# Patient Record
Sex: MALE | Race: OTHER | HISPANIC OR LATINO | URBAN - METROPOLITAN AREA
[De-identification: names, ages, dates, MRNs, and addresses within clinical notes are randomized per-mention and may not be internally consistent; named-entity substitution may affect disease eponyms.]

---

## 2018-02-16 ENCOUNTER — EMERGENCY (EMERGENCY)
Facility: HOSPITAL | Age: 28
LOS: 1 days | Discharge: HOME | End: 2018-02-16
Attending: EMERGENCY MEDICINE | Admitting: EMERGENCY MEDICINE
Payer: COMMERCIAL

## 2018-02-16 VITALS
OXYGEN SATURATION: 100 % | SYSTOLIC BLOOD PRESSURE: 133 MMHG | DIASTOLIC BLOOD PRESSURE: 65 MMHG | RESPIRATION RATE: 16 BRPM | TEMPERATURE: 98 F | HEART RATE: 65 BPM

## 2018-02-16 PROCEDURE — 99283 EMERGENCY DEPT VISIT LOW MDM: CPT

## 2018-02-16 NOTE — ED PROVIDER NOTE - OBJECTIVE STATEMENT
28yo m w/o pmh p/w urinary frequency and urgency for past two weeks. + hx of UTIs in the past, never followed with a urologist. No known fevers or chills. No new sexual contacts, no penile discharge. No n/v/d. + recent intentional weight loss after becoming vegan. + FH of DM.

## 2018-02-16 NOTE — ED PROVIDER NOTE - PLAN OF CARE
Follow up with your Doctor in 1-2 days.  Follow up with Urology see attached list.  Drink plenty of fluids.  Take Keflex 500mg orally 2 x a day x 7 days.  Return to the ER for any persistent/worsening or new symptoms fevers, chills, abdominal pain, difficulty urinating or any concerning symptoms.

## 2018-02-16 NOTE — ED ADULT TRIAGE NOTE - CHIEF COMPLAINT QUOTE
pt. came in c/o intermittent urinary frequency x 3 weeks. denies any pain upon urination, no hematuria nor penile discharge. denies any fever. no PMHx.

## 2018-02-16 NOTE — ED PROVIDER NOTE - ATTENDING CONTRIBUTION TO CARE
agree with resident note  26yo m w/o pmh p/w urinary frequency and urgency for past two weeks.  Has had remote hx of UTIs.  Denies penile discharge or new sexual partners.  Does not want to be tested for STis    PE: well appearing; VSS; CTAB/L; s1 s2 no m/r/g abd soft/NT/ND back: no CVA tenderness

## 2018-02-16 NOTE — ED PROVIDER NOTE - PROGRESS NOTE DETAILS
JONES Guillory: received sign out from Dr Powell to follow up UA and discharge home.  Rx sent for Keflex by Dr. Ramirez pt feels better ambulating without difficulty.  Results reviewed with patient.  Discharge reviewed and discussed with patient.

## 2018-02-16 NOTE — ED PROVIDER NOTE - CARE PLAN
Principal Discharge DX:	Dysuria  Assessment and plan of treatment:	Follow up with your Doctor in 1-2 days.  Follow up with Urology see attached list.  Drink plenty of fluids.  Take Keflex 500mg orally 2 x a day x 7 days.  Return to the ER for any persistent/worsening or new symptoms fevers, chills, abdominal pain, difficulty urinating or any concerning symptoms.

## 2018-02-17 LAB
APPEARANCE UR: CLEAR — SIGNIFICANT CHANGE UP
BILIRUB UR-MCNC: NEGATIVE — SIGNIFICANT CHANGE UP
BLOOD UR QL VISUAL: NEGATIVE — SIGNIFICANT CHANGE UP
COLOR SPEC: SIGNIFICANT CHANGE UP
GLUCOSE UR-MCNC: NEGATIVE — SIGNIFICANT CHANGE UP
KETONES UR-MCNC: NEGATIVE — SIGNIFICANT CHANGE UP
LEUKOCYTE ESTERASE UR-ACNC: NEGATIVE — SIGNIFICANT CHANGE UP
MUCOUS THREADS # UR AUTO: SIGNIFICANT CHANGE UP
NITRITE UR-MCNC: NEGATIVE — SIGNIFICANT CHANGE UP
PH UR: 6 — SIGNIFICANT CHANGE UP (ref 4.6–8)
PROT UR-MCNC: NEGATIVE MG/DL — SIGNIFICANT CHANGE UP
RBC CASTS # UR COMP ASSIST: SIGNIFICANT CHANGE UP (ref 0–?)
SP GR SPEC: 1.02 — SIGNIFICANT CHANGE UP (ref 1–1.04)
SQUAMOUS # UR AUTO: SIGNIFICANT CHANGE UP
UROBILINOGEN FLD QL: NORMAL MG/DL — SIGNIFICANT CHANGE UP
WBC UR QL: SIGNIFICANT CHANGE UP (ref 0–?)

## 2018-02-17 RX ORDER — CEPHALEXIN 500 MG
1 CAPSULE ORAL
Qty: 14 | Refills: 0
Start: 2018-02-17 | End: 2018-02-23

## 2018-02-17 RX ORDER — CEPHALEXIN 500 MG
500 CAPSULE ORAL EVERY 12 HOURS
Qty: 0 | Refills: 0 | Status: DISCONTINUED | OUTPATIENT
Start: 2018-02-17 | End: 2018-02-20

## 2018-02-17 RX ORDER — CEPHALEXIN 500 MG
1 CAPSULE ORAL
Qty: 14 | Refills: 0 | OUTPATIENT
Start: 2018-02-17 | End: 2018-02-23

## 2018-02-17 RX ADMIN — Medication 500 MILLIGRAM(S): at 01:09

## 2018-06-08 ENCOUNTER — EMERGENCY (EMERGENCY)
Facility: HOSPITAL | Age: 28
LOS: 1 days | Discharge: HOME | End: 2018-06-08
Attending: EMERGENCY MEDICINE | Admitting: EMERGENCY MEDICINE
Payer: COMMERCIAL

## 2018-06-08 VITALS
OXYGEN SATURATION: 98 % | RESPIRATION RATE: 18 BRPM | DIASTOLIC BLOOD PRESSURE: 86 MMHG | HEART RATE: 71 BPM | TEMPERATURE: 98 F | SYSTOLIC BLOOD PRESSURE: 155 MMHG

## 2018-06-08 LAB
APPEARANCE UR: CLEAR — SIGNIFICANT CHANGE UP
BACTERIA # UR AUTO: SIGNIFICANT CHANGE UP
BILIRUB UR-MCNC: NEGATIVE — SIGNIFICANT CHANGE UP
BLOOD UR QL VISUAL: NEGATIVE — SIGNIFICANT CHANGE UP
C TRACH RRNA SPEC QL NAA+PROBE: SIGNIFICANT CHANGE UP
COLOR SPEC: YELLOW — SIGNIFICANT CHANGE UP
GLUCOSE UR-MCNC: NEGATIVE — SIGNIFICANT CHANGE UP
KETONES UR-MCNC: SIGNIFICANT CHANGE UP
LEUKOCYTE ESTERASE UR-ACNC: HIGH
MUCOUS THREADS # UR AUTO: SIGNIFICANT CHANGE UP
N GONORRHOEA RRNA SPEC QL NAA+PROBE: SIGNIFICANT CHANGE UP
NITRITE UR-MCNC: NEGATIVE — SIGNIFICANT CHANGE UP
PH UR: 7 — SIGNIFICANT CHANGE UP (ref 4.6–8)
PROT UR-MCNC: 30 MG/DL — HIGH
RBC CASTS # UR COMP ASSIST: SIGNIFICANT CHANGE UP (ref 0–?)
SP GR SPEC: 1.03 — SIGNIFICANT CHANGE UP (ref 1–1.04)
SPECIMEN SOURCE: SIGNIFICANT CHANGE UP
UROBILINOGEN FLD QL: 1 MG/DL — SIGNIFICANT CHANGE UP
WBC UR QL: SIGNIFICANT CHANGE UP (ref 0–?)

## 2018-06-08 PROCEDURE — 99283 EMERGENCY DEPT VISIT LOW MDM: CPT | Mod: 25

## 2018-06-08 RX ORDER — CEPHALEXIN 500 MG
500 CAPSULE ORAL ONCE
Qty: 0 | Refills: 0 | Status: COMPLETED | OUTPATIENT
Start: 2018-06-08 | End: 2018-06-08

## 2018-06-08 RX ORDER — CEPHALEXIN 500 MG
1 CAPSULE ORAL
Qty: 14 | Refills: 0
Start: 2018-06-08 | End: 2018-06-14

## 2018-06-08 RX ADMIN — Medication 500 MILLIGRAM(S): at 04:53

## 2018-06-08 NOTE — ED ADULT NURSE NOTE - OBJECTIVE STATEMENT
Pt arrives to -B c/o urinary pressure and frequency for 2 days..  Pt denies pain.  Pt states he was seen at [facility] a few months ago for same reason and received antibiotics with relief of symptoms.  Pt tates for the past few months he has had it on and off.  Never followed up with PMD or urologist.  Pt is sexually active with one female partner - no new partners and no known h/o STDs.  Pt denies discharge.  Non sterile sample of urine collected and sent.  Awaiting clean sample.

## 2018-06-08 NOTE — ED ADULT TRIAGE NOTE - CHIEF COMPLAINT QUOTE
c/o Urgency and frequency of urination. denies dysuria but c/o lower abdominal pressure on and off. No fever. Pt was seen here for the same in February and was treated with antibiotic.

## 2018-06-08 NOTE — ED PROVIDER NOTE - MEDICAL DECISION MAKING DETAILS
28 y/o M with intermittent episodes of urinary frequency and pressure.  Will check ua, gc/chalmydia, likely outpatient uro follow-up.

## 2018-06-09 LAB
BACTERIA UR CULT: SIGNIFICANT CHANGE UP
SPECIMEN SOURCE: SIGNIFICANT CHANGE UP

## 2019-01-11 NOTE — ED PROVIDER NOTE - OBJECTIVE STATEMENT
26 y/o otherwise healthy M here with urinary pressure and frequency.  Pt reports he was seen in the ER a few months ago for same, received abx with relief of sxs.  He reports a chronic h/o intermittent episodes of these symptoms.  He states for the past few months he has had it on and off.  Never followed up with PMD or urologist.  States current sxs began 2 days ago.  No fever, chills, back pain, abd pain, n/v, dysuria, penile discharge, penile lesions, testicular pain or swelling.  Pt is sexually active with one female partner - no new partners and no known h/o STDs.
verbal instruction

## 2019-07-30 ENCOUNTER — EMERGENCY (EMERGENCY)
Facility: HOSPITAL | Age: 29
LOS: 1 days | Discharge: HOME | End: 2019-07-30
Attending: STUDENT IN AN ORGANIZED HEALTH CARE EDUCATION/TRAINING PROGRAM | Admitting: EMERGENCY MEDICINE
Payer: SELF-PAY

## 2019-07-30 VITALS
TEMPERATURE: 98 F | HEART RATE: 70 BPM | RESPIRATION RATE: 16 BRPM | OXYGEN SATURATION: 98 % | SYSTOLIC BLOOD PRESSURE: 137 MMHG | DIASTOLIC BLOOD PRESSURE: 78 MMHG

## 2019-07-30 PROCEDURE — 99283 EMERGENCY DEPT VISIT LOW MDM: CPT

## 2019-07-30 PROCEDURE — 71046 X-RAY EXAM CHEST 2 VIEWS: CPT | Mod: 26

## 2019-07-30 PROCEDURE — 70360 X-RAY EXAM OF NECK: CPT | Mod: 26

## 2019-07-30 RX ORDER — FAMOTIDINE 10 MG/ML
20 INJECTION INTRAVENOUS DAILY
Refills: 0 | Status: DISCONTINUED | OUTPATIENT
Start: 2019-07-30 | End: 2019-08-09

## 2019-07-30 RX ADMIN — FAMOTIDINE 20 MILLIGRAM(S): 10 INJECTION INTRAVENOUS at 20:41

## 2019-07-30 NOTE — ED PROVIDER NOTE - OBJECTIVE STATEMENT
29 yo M c PMH of heart murmur otherwise healthy p/w 2 month h/o feeling sensation of something stuck in throat, sometimes feeling like when he swallows his saliva it "goes down the wrong pipe," and when that happens feeling a spasm in his chest with some sob, that all worsened last night. did not take meds or see doctor for sx. no h/o prior sx. no recent travel. no ROE or orthopnea or cp.

## 2019-07-30 NOTE — ED PROVIDER NOTE - RESPIRATORY DISTRESS
no increased wob or stridor or wheezing or rales speaking in full sentences comfortably no accessory mm use no lip swelling/no

## 2019-07-30 NOTE — ED ADULT NURSE NOTE - OBJECTIVE STATEMENT
pt sitting in stretcher in no acute distress reports for the past few days after he eats he feels like food is going the wrong way and some pain in his chest.  no sob, respirations even and unlabored

## 2019-07-30 NOTE — ED PROVIDER NOTE - CLINICAL SUMMARY MEDICAL DECISION MAKING FREE TEXT BOX
29 yo M c PMH of heart murmur otherwise healthy p/w 2 month h/o feeling sensation of something stuck in throat, saliva going down the wrong pipe and sob and chest spasm associated. on exam, VS wnl, no remarkable exam findings. sx suggest gerd vs globus pallidus. cxr pending. will give pepcid and may rx pepcid and dc with gi f/u

## 2019-07-30 NOTE — ED PROVIDER NOTE - ATTENDING CONTRIBUTION TO CARE
29 yo M c PMH of heart murmur otherwise healthy p/w 2 month h/o feeling sensation of something stuck in throat, saliva going down the wrong pipe and sob and chest spasm associated. on exam, VS wnl, no remarkable exam findings. sx suggest gerd vs globus pallidus. cxr pending. will give pepcid and may rx pepcid and dc with gi f/u    D. Malachi: I have personally performed a face to face bedside history and physical examination of this patient. I have discussed the history, examination, review of systems, assessment and plan of management with the ACP I have reviewed the electronic medical record and amended it to reflect my history, review of systems, physical exam, assessment and plan. 27 yo M c PMH of heart murmur otherwise healthy p/w 2 month h/o feeling sensation of something stuck in throat, saliva going down the wrong pipe and sob and chest spasm associated. on exam, VS wnl, no remarkable exam findings. sx suggest gerd vs globus pallidus. cxr pending. will give pepcid and may rx pepcid and dc with gi f/u    D. Malachi: I have personally performed a face to face bedside history and physical examination of this patient. I have discussed the history, examination, review of systems, assessment and plan of management with the resident. I have reviewed the electronic medical record and amended it to reflect my history, review of systems, physical exam, assessment and plan.

## 2020-01-22 ENCOUNTER — TRANSCRIPTION ENCOUNTER (OUTPATIENT)
Age: 30
End: 2020-01-22

## 2020-01-22 ENCOUNTER — APPOINTMENT (OUTPATIENT)
Dept: INTERNAL MEDICINE | Facility: CLINIC | Age: 30
End: 2020-01-22
Payer: COMMERCIAL

## 2020-01-22 ENCOUNTER — APPOINTMENT (OUTPATIENT)
Dept: UROLOGY | Facility: CLINIC | Age: 30
End: 2020-01-22
Payer: COMMERCIAL

## 2020-01-22 VITALS
TEMPERATURE: 98.3 F | HEIGHT: 73 IN | SYSTOLIC BLOOD PRESSURE: 127 MMHG | HEART RATE: 70 BPM | OXYGEN SATURATION: 97 % | DIASTOLIC BLOOD PRESSURE: 79 MMHG | WEIGHT: 279 LBS | BODY MASS INDEX: 36.98 KG/M2

## 2020-01-22 VITALS
WEIGHT: 279 LBS | BODY MASS INDEX: 36.98 KG/M2 | SYSTOLIC BLOOD PRESSURE: 120 MMHG | HEIGHT: 73 IN | DIASTOLIC BLOOD PRESSURE: 76 MMHG | HEART RATE: 66 BPM

## 2020-01-22 DIAGNOSIS — Z83.3 FAMILY HISTORY OF DIABETES MELLITUS: ICD-10-CM

## 2020-01-22 DIAGNOSIS — Z82.49 FAMILY HISTORY OF ISCHEMIC HEART DISEASE AND OTHER DISEASES OF THE CIRCULATORY SYSTEM: ICD-10-CM

## 2020-01-22 DIAGNOSIS — Z86.79 PERSONAL HISTORY OF OTHER DISEASES OF THE CIRCULATORY SYSTEM: ICD-10-CM

## 2020-01-22 DIAGNOSIS — Z56.0 UNEMPLOYMENT, UNSPECIFIED: ICD-10-CM

## 2020-01-22 DIAGNOSIS — Z78.9 OTHER SPECIFIED HEALTH STATUS: ICD-10-CM

## 2020-01-22 DIAGNOSIS — K62.5 HEMORRHAGE OF ANUS AND RECTUM: ICD-10-CM

## 2020-01-22 DIAGNOSIS — Z00.00 ENCOUNTER FOR GENERAL ADULT MEDICAL EXAMINATION W/OUT ABNORMAL FINDINGS: ICD-10-CM

## 2020-01-22 LAB
BASOPHILS # BLD AUTO: 0.05 K/UL
BASOPHILS NFR BLD AUTO: 0.8 %
EOSINOPHIL # BLD AUTO: 0.16 K/UL
EOSINOPHIL NFR BLD AUTO: 2.5 %
HCT VFR BLD CALC: 49.7 %
HGB BLD-MCNC: 16.1 G/DL
IMM GRANULOCYTES NFR BLD AUTO: 0.2 %
LYMPHOCYTES # BLD AUTO: 2.2 K/UL
LYMPHOCYTES NFR BLD AUTO: 34.9 %
MAN DIFF?: NORMAL
MCHC RBC-ENTMCNC: 28.6 PG
MCHC RBC-ENTMCNC: 32.4 GM/DL
MCV RBC AUTO: 88.4 FL
MONOCYTES # BLD AUTO: 0.4 K/UL
MONOCYTES NFR BLD AUTO: 6.3 %
NEUTROPHILS # BLD AUTO: 3.49 K/UL
NEUTROPHILS NFR BLD AUTO: 55.3 %
PLATELET # BLD AUTO: 242 K/UL
RBC # BLD: 5.62 M/UL
RBC # FLD: 12.5 %
WBC # FLD AUTO: 6.31 K/UL

## 2020-01-22 PROCEDURE — 99385 PREV VISIT NEW AGE 18-39: CPT | Mod: 25

## 2020-01-22 PROCEDURE — 99215 OFFICE O/P EST HI 40 MIN: CPT | Mod: 25

## 2020-01-22 PROCEDURE — 99204 OFFICE O/P NEW MOD 45 MIN: CPT

## 2020-01-22 PROCEDURE — 82270 OCCULT BLOOD FECES: CPT

## 2020-01-22 SDOH — ECONOMIC STABILITY - INCOME SECURITY: UNEMPLOYMENT, UNSPECIFIED: Z56.0

## 2020-01-22 NOTE — ASSESSMENT
[FreeTextEntry1] : health He needs blood testing eye exam and is up to date with dental He needs dtap and refused flu vaccine bmi  34  risks of obesity and need for diet and eating healthy Obesity is a complex disorder involving an excessive amount of body fat. Obesity isn't just a cosmetic concern. It increases your risk of diseases and health problems, such as heart disease, diabetes and high blood pressure.\par \par Being extremely obese means you are especially likely to have health problems related to your weight.\par \par \par The good news is that even modest weight loss can improve or prevent the health problems associated with obesity. Dietary changes, increased physical activity and behavior changes can help you lose weight. Prescription medications and weight-loss surgery are additional options for treating obesity.\par \par \par \par \par Symptoms\par \par Obesity is diagnosed when your body mass index (BMI) is 30 or higher. Your body mass index is calculated by dividing your weight in kilograms (kg) by your height in meters (m) squared. \par \par \par BMI\par \par Weight status\par \par \par Below 18.5 Underweight \par 18.5-24.9 Normal \par 25.0-29.9 Overweight \par 30.0-34.9 Obese (Class I) \par 35.0-39.9 Obese (Class II) \par 40.0 and higher Extreme obesity (Class III) \par \par For most people, BMI provides a reasonable estimate of body fat. However, BMI doesn't directly measure body fat, so some people, such as muscular athletes, may have a BMI in the obese category even though they don't have excess body fat. Ask your doctor if your BMI is a problem. \par \par When to see a doctor\par \par If you think you may be obese, and especially if you're concerned about weight-related health problems, see your doctor or health care provider. You and your provider can evaluate your health risks and discuss your weight-loss options. \par \par Request an Appointment at [facility]\par \par Causes\par \par Although there are genetic, behavioral and hormonal influences on body weight, obesity occurs when you take in more calories than you burn through exercise and normal daily activities. Your body stores these excess calories as fat.\par \par Obesity can sometimes be traced to a medical cause, such as Prader-Willi syndrome, Cushing's syndrome, and other diseases and conditions. However, these disorders are rare and, in general, the principal causes of obesity are:\par •Inactivity. If you're not very active, you don't burn as many calories. With a sedentary lifestyle, you can easily take in more calories every day than you use through exercise and normal daily activities.\par •Unhealthy diet and eating habits. Weight gain is inevitable if you regularly eat more calories than you burn. And most Americans' diets are too high in calories and are full of fast food and high-calorie beverages.\par \par Risk factors\par \par Obesity usually results from a combination of causes and contributing factors, including:\par •Genetics. Your genes may affect the amount of body fat you store, and where that fat is distributed. Genetics may also play a role in how efficiently your body converts food into energy and how your body burns calories during exercise.\par •Family lifestyle. Obesity tends to run in families. If one or both of your parents are obese, your risk of being obese is increased. That's not just because of genetics. Family members tend to share similar eating and activity habits.\par •Inactivity. If you're not very active, you don't burn as many calories. With a sedentary lifestyle, you can easily take in more calories every day than you burn through exercise and routine daily activities. Having medical problems, such as arthritis, can lead to decreased activity, which contributes to weight gain.\par •Unhealthy diet. A diet that's high in calories, lacking in fruits and vegetables, full of fast food, and laden with high-calorie beverages and oversized portions contributes to weight gain.\par •Medical problems. In some people, obesity can be traced to a medical cause, such as Prader-Willi syndrome, Cushing's syndrome and other conditions. Medical problems, such as arthritis, also can lead to decreased activity, which may result in weight gain.\par •Certain medications. Some medications can lead to weight gain if you don't compensate through diet or activity. These medications include some antidepressants, anti-seizure medications, diabetes medications, antipsychotic medications, steroids and beta blockers.\par •Social and economic issues. Research has linked social and economic factors to obesity. Avoiding obesity is difficult if you don't have safe areas to exercise. Similarly, you may not have been taught healthy ways of cooking, or you may not have money to buy healthier foods. In addition, the people you spend time with may influence your weight — you're more likely to become obese if you have obese friends or relatives.\par •Age. Obesity can occur at any age, even in young children. But as you age, hormonal changes and a less active lifestyle increase your risk of obesity. In addition, the amount of muscle in your body tends to decrease with age. This lower muscle mass leads to a decrease in metabolism. These changes also reduce calorie needs, and can make it harder to keep off excess weight. If you don't consciously control what you eat and become more physically active as you age, you'll likely gain weight.\par •Pregnancy. During pregnancy, a woman's weight necessarily increases. Some women find this weight difficult to lose after the baby is born. This weight gain may contribute to the development of obesity in women.\par •Quitting smoking. Quitting smoking is often associated with weight gain. And for some, it can lead to enough weight gain that the person becomes obese. In the long run, however, quitting smoking is still a greater benefit to your health than continuing to smoke.\par •Lack of sleep. Not getting enough sleep or getting too much sleep can cause changes in hormones that increase your appetite. You may also crave foods high in calories and carbohydrates, which can contribute to weight gain.\par \par Even if you have one or more of these risk factors, it doesn't mean that you're destined to become obese. You can counteract most risk factors through diet, physical activity and exercise, and behavior changes.\par \par Complications\par \par If you're obese, you're more likely to develop a number of potentially serious health problems, including:\par •High triglycerides and low high-density lipoprotein (HDL) cholesterol\par •Type 2 diabetes\par •High blood pressure\par •Metabolic syndrome — a combination of high blood sugar, high blood pressure, high triglycerides and low HDL cholesterol\par •Heart disease\par •Stroke\par •Cancer, including cancer of the uterus, cervix, endometrium, ovaries, breast, colon, rectum, esophagus, liver, gallbladder, pancreas, kidney and prostate\par •Breathing disorders, including sleep apnea, a potentially serious sleep disorder in which breathing repeatedly stops and starts\par •Gallbladder disease\par •Gynecological problems, such as infertility and irregular periods\par •Erectile dysfunction and sexual health issues\par •Nonalcoholic fatty liver disease, a condition in which fat builds up in the liver and can cause inflammation or scarring\par •Osteoarthritis\par \par Quality of life\par \par When you're obese, your overall quality of life may be diminished. You may not be able to do things you used to do, such as participating in enjoyable activities. You may avoid public places. Obese people may even encounter discrimination.\par \par Other weight-related issues that may affect your quality of life include:\par •Depression\par •Disability\par •Sexual problems\par •Shame and guilt\par •Social isolation\par •Lower work achievement\par \par Prevention\par \par Whether you're at risk of becoming obese, currently overweight or at a healthy weight, you can take steps to prevent unhealthy weight gain and related health problems. Not surprisingly, the steps to prevent weight gain are the same as the steps to lose weight: daily exercise, a healthy diet, and a long-term commitment to watch what you eat and drink.\par •Exercise regularly. You need to get 150 to 300 minutes of moderate-intensity activity a week to prevent weight gain. Moderately intense physical activities include fast walking and swimming.\par •Follow a healthy eating plan. Focus on low-calorie, nutrient-dense foods, such as fruits, veg\par Rectal bleeding stools softener , high fiber diet  increase water intake to 8 glasses a day.  Constipation Dietary fiber — found mainly in fruits, vegetables, whole grains and legumes — is probably best known for its ability to prevent or relieve constipation. But foods containing fiber can provide other health benefits as well, such as helping to maintain a healthy weight and lowering your risk of diabetes and heart disease.\par \par Selecting tasty foods that provide fiber isn't difficult. Find out how much dietary fiber you need, the foods that contain it, and how to add them to meals and snacks.\par \par Dietary fiber, also known as roughage or bulk, includes the parts of plant foods your body can't digest or absorb. Unlike other food components, such as fats, proteins or carbohydrates — which your body breaks down and absorbs — fiber isn't digested by your body. Instead, it passes relatively intact through your stomach, small intestine and colon and out of your body.\par \par Fiber is commonly classified as soluble, which dissolves in water, or insoluble, which doesn't dissolve.\par •Soluble fiber. This type of fiber dissolves in water to form a gel-like material. It can help lower blood cholesterol and glucose levels. Soluble fiber is found in oats, peas, beans, apples, citrus fruits, carrots, barley and psyllium.\par •Insoluble fiber. This type of fiber promotes the movement of material through your digestive system and increases stool bulk, so it can be of benefit to those who struggle with constipation or irregular stools. Whole-wheat flour, wheat bran, nuts, beans and vegetables, such as cauliflower, green beans and potatoes, are good sources of insoluble fiber.\par \par Most plant-based foods, such as oatmeal and beans, contain both soluble and insoluble fiber. However, the amount of each type varies in different plant foods. To receive the greatest health benefit, eat a wide variety of high-fiber foods.\par \par A high-fiber diet has many benefits, which include:\par •Normalizes bowel movements. Dietary fiber increases the weight and size of your stool and softens it. A bulky stool is easier to pass, decreasing your chance of constipation. If you have loose, watery stools, fiber may help to solidify the stool because it absorbs water and adds bulk to stool.\par •Helps maintain bowel health. A high-fiber diet may lower your risk of developing hemorrhoids and small pouches in your colon (diverticular disease). Some fiber is fermented in the colon. Researchers are looking at how this may play a role in preventing diseases of the colon.\par •Lowers cholesterol levels. Soluble fiber found in beans, oats, flaxseed and oat bran may help lower total blood cholesterol levels by lowering low-density lipoprotein, or "bad," cholesterol levels. Studies also have shown that high-fiber foods may have other heart-health benefits, such as reducing blood pressure and inflammation.\par •Helps control blood sugar levels. In people with diabetes, fiber — particularly soluble fiber — can slow the absorption of sugar and help improve blood sugar levels. A healthy diet that includes insoluble fiber may also reduce the risk of developing type 2 diabetes.\par •Aids in achieving healthy weight. High-fiber foods tend to be more filling than low-fiber foods, so you're likely to eat less and stay satisfied longer. And high-fiber foods tend to take longer to eat and to be less "energy dense," which means they have fewer calories for the same volume of food.\par \par Another benefit attributed to dietary fiber is prevention of colorectal cancer. However, the evidence that fiber reduces colorectal cancer is mixed.\par \par The [employer], which provides science-based advice on matters of medicine and health, gives the following daily fiber recommendations for adults:\par \par \par \par Age 50 or younger\par \par Age 51 or older\par \par \par [employer] \par \par Men 38 grams 30 grams \par Women 25 grams 21 grams \par \par If you aren't getting enough fiber each day, you may need to boost your intake. Good choices include:\par •Whole-grain products\par •Fruits\par •Vegetables\par •Beans, peas and other legumes\par •Nuts and seeds\par \par Refined or processed foods — such as canned fruits and vegetables, pulp-free juices, white breads and pastas, and non-whole-grain cereals — are lower in fiber. The grain-refining process removes the outer coat (bran) from the grain, which lowers its fiber content. Enriched foods have some of the B vitamins and iron back after processing, but not the fiber.\par \par Whole foods rather than fiber supplements are generally better. Fiber supplements — such as Metamucil, Citrucel and FiberCon — don't provide the variety of fibers, vitamins, minerals and other beneficial nutrients that foods do.\par \par Another way to get more fiber is to eat foods, such as cereal, granola bars, yogurt, and ice cream, with fiber added. The added fiber usually is labeled as "inulin" or "chicory root." Some people complain of gassiness after eating foods with added fiber.\par \par However, some people may still need a fiber supplement if dietary changes aren't sufficient or if they have certain medical conditions, such as constipation, diarrhea or irritable bowel syndrome. Check with your doctor before taking fiber supplements.\par \par Need ideas for adding more fiber to your meals and snacks? Try these suggestions:\par •Jump-start your day. For breakfast choose a high-fiber breakfast cereal — 5 or more grams of fiber a serving. Opt for cereals with "whole grain," "bran" or "fiber" in the name. Or add a few tablespoons of unprocessed wheat bran to your favorite cereal.\par •Switch to whole grains. Consume at least half of all grains as whole grains. Look for breads that list whole wheat, whole-wheat flour or another whole grain as the first ingredient on the label and have least 2 grams of dietary fiber a serving. [place] with brown rice, wild rice, barley, whole-wheat pasta and bulgur wheat.\par •Bulk up baked goods. Substitute whole-grain flour for half or all of the white flour when baking. Try adding crushed bran cereal, unprocessed wheat bran or uncooked oatmeal to muffins, cakes and cookies.\par •Lean on legumes. Beans, peas and lentils are excellent sources of fiber. Add kidney beans to canned soup or a green salad. Or make nachos with refried black beans, lots of fresh veggies, whole-wheat tortilla chips and salsa.\par •Eat more fruit and vegetables. Fruits and vegetables are rich in fiber, as well as vitamins and minerals. Try to eat five or more servings daily.\par •Make snacks count. Fresh fruits, raw vegetables, low-fat popcorn and whole-grain crackers are all good choices. An occasional handful of nuts or dried fruits also is a healthy, high-fiber snack — although be aware that nuts and dried fruits are high in calories.\par \par High-fiber foods are good for your health. But adding too much fiber too quickly can promote intestinal gas, abdominal bloating and cramping. Increase fiber in your diet gradually over a period of a few weeks. This allows the natural bacteria in your digestive system to adjust to the change.\par \par Also, drink plenty of water. Fiber works best when it absorbs water, making your stool soft and bulky I will give medicated tucks sitz baths and proctosol to be inserted  into rectum and applied outside .  \par memory changes   refer to neurologist for testing  and evaluation . testicular nodule needs us of testicles and has prostate enlargement  and luts and will see urologist today.

## 2020-01-22 NOTE — PHYSICAL EXAM
[Normal Appearance] : normal appearance [General Appearance - Well Developed] : well developed [General Appearance - Well Nourished] : well nourished [Edema] : no peripheral edema [General Appearance - In No Acute Distress] : no acute distress [Well Groomed] : well groomed [Exaggerated Use Of Accessory Muscles For Inspiration] : no accessory muscle use [Respiration, Rhythm And Depth] : normal respiratory rhythm and effort [Abdomen Soft] : soft [Abdomen Tenderness] : non-tender [Urethral Meatus] : meatus normal [Costovertebral Angle Tenderness] : no ~M costovertebral angle tenderness [No Prostate Nodules] : no prostate nodules [Scrotum] : the scrotum was normal [Urinary Bladder Findings] : the bladder was normal on palpation [Testes Mass (___cm)] : there were no testicular masses [FreeTextEntry1] : bilateral scrotal nodules with findings concerning for epididymal cysts [Normal Station and Gait] : the gait and station were normal for the patient's age [No Focal Deficits] : no focal deficits [] : no rash [Oriented To Time, Place, And Person] : oriented to person, place, and time [Affect] : the affect was normal [Mood] : the mood was normal [Not Anxious] : not anxious [No Palpable Adenopathy] : no palpable adenopathy

## 2020-01-22 NOTE — HEALTH RISK ASSESSMENT
[Very Good] : ~his/her~  mood as very good [Fair] : ~his/her~ current health as fair  [No falls in past year] : Patient reported no falls in the past year [No] : No [0] : 2) Feeling down, depressed, or hopeless: Not at all (0) [Hepatitis C test offered] : Hepatitis C test offered [HIV Test offered] : HIV Test offered [With Family] : lives with family [None] : None [Unemployed] : unemployed [# of Members in Household ___] :  household currently consist of [unfilled] member(s) [] :  [High School] : high school [# Of Children ___] : has [unfilled] children [Sexually Active] : sexually active [Feels Safe at Home] : Feels safe at home [Fully functional (using the telephone, shopping, preparing meals, housekeeping, doing laundry, using] : Fully functional and needs no help or supervision to perform IADLs (using the telephone, shopping, preparing meals, housekeeping, doing laundry, using transportation, managing medications and managing finances) [Fully functional (bathing, dressing, toileting, transferring, walking, feeding)] : Fully functional (bathing, dressing, toileting, transferring, walking, feeding) [Smoke Detector] : smoke detector [Carbon Monoxide Detector] : carbon monoxide detector [Safety elements used in home] : safety elements used in home [Seat Belt] :  uses seat belt [] : No [FreeTextEntry1] : foregetfullness  [de-identified] : no specific  exercise  [de-identified] : healthy  [[id number]] : 0 [Change in mental status noted] : No change in mental status noted [Language] : denies difficulty with language [Learning/Retaining New Information] : denies difficulty learning/retaining new information [Handling Complex Tasks] : denies difficulty handling complex tasks [Behavior] : denies difficulty with behavior [Reasoning] : denies difficulty with reasoning [Reports changes in hearing] : Reports no changes in hearing [Reports changes in vision] : Reports no changes in vision [Reports changes in dental health] : Reports no changes in dental health [Guns at Home] : no guns at home [TB Exposure] : is not being exposed to tuberculosis [Sunscreen] : does not use sunscreen [Travel to Developing Areas] : does not  travel to developing areas [FreeTextEntry2] : prior job   [Caregiver Concerns] : does not have caregiver concerns [de-identified] : last exam few months ago [de-identified] : none last eye exam 1.5 yrs  [AdvancecareDate] : 1/20

## 2020-01-22 NOTE — REVIEW OF SYSTEMS
[Patient Intake Form Reviewed] : Patient intake form was reviewed [Constipation] : constipation [Memory Loss] : memory loss [Negative] : Heme/Lymph

## 2020-01-22 NOTE — ASSESSMENT
[FreeTextEntry1] : Very pleasant 29-year-old gentleman with bilateral scrotal nodules, weak urinary stream\par -Urinalysis\par -Scrotal ultrasound for scrotal nodules\par -Cystoscopy given weak urinary stream and concern for a urethral stricture\par -GC/chlamydia\par -HIV\par -Syphilis\par -Followup for cystoscopy and ultrasound\par -We discussed potential etiologies of scrotal nodules, as well as weak urinary stream

## 2020-01-22 NOTE — HISTORY OF PRESENT ILLNESS
[de-identified] : Pt is a 29 yr old man who came to establish medical care. Pt states he has noticed a change in his memory 5 months ago. he forgets appt and things he has to do.  he also has rectal bleeding.  and hemorrhoids which started a few months ago.  he has had it prior one year ago and at times is constipated . he has taken otc medication  .  he states he noticed them coming out of rectum.   he alkso has had increased urination throughout the day and he doesn’t feel as if he completes his urination.  he has to push out his urine to start  and doesn’t wake up during night.  No problems with ejaculation.  he has intercourse two times a month.    [FreeTextEntry1] : new pt

## 2020-01-22 NOTE — PHYSICAL EXAM
[Well Developed] : well developed [Well Nourished] : well nourished [Normal Voice Quality] : was normal [Normal Verbal Skills] : the patient had normal verbal communication skills [Normal Nonverbal Skills] : normal nonverbal communication skills were demonstrated [Conjunctiva] : the conjunctiva were normal in both eyes [PERRL] : pupils were equal in size, round, and reactive to light [EOM Intact] : extraocular movements were intact [Normal Appearance] : was normal in appearance [Neck Supple] : was supple [Rate ___] : at [unfilled] breaths per minute [Normal Rhythm/Effort] : normal respiratory rhythm and effort [Clear Bilaterally] : the lungs were clear to auscultation bilaterally [Normal to Percussion] : the lungs were normal to percussion [5th Left ICS - MCL] : palpated at the 5th LICS in the midclavicular line [Heart Rate ___] : [unfilled] bpm [Normal Rate] : normal [Normal S1] : normal S1 [Normal S2] : normal S2 [No Murmur] : no murmurs heard [No Pitting Edema] : no pitting edema present [2+] : left 2+ [No Abnormalities] : the abdominal aorta was not enlarged and no bruit was heard [Examination Of The Breasts] : a normal appearance [No Discharge] : no discharge [Soft, Nontender] : the abdomen was soft and nontender [No Mass] : no masses were palpated [No HSM] : no hepatosplenomegaly noted [None] : no CVA tenderness [No Lymphangitis] : no lymphangitis observed [Normal Kyphosis] : normal kyphosis [No Visual Abnormalities] : no visible abnormalities [Normal Lordosis] : normal lordosis [No Scoliosis] : no scoliosis [No Tenderness to Palpation] : no spine tenderness on palpation [No Masses] : no masses [Full ROM] : full ROM [No Pain with ROM] : no pain with motion in any direction [Intact] : all reflexes within normal limits bilaterally [Normal Station and Gait] : the gait and station were normal [Normal Motor Tone] : the muscle tone was normal [Involuntary Movements] : no involuntary movements were seen [Normal Scalp] : inspection of the scalp showed no abnormalities [Examination Of The Hair] : texture and distribution of hair was normal [Complexion Medium] : medium complexion [Normal Mental Status] : the patient's orientation, memory, attention, language and fund of knowledge were normal [Appropriate] : appropriate [JVP Elevated ___cm] : the JVP was not elevated [Enlarged Diffusely] : was not enlarged [S3] : no S3 [Lt] : no varicose veins of the left leg [Rt] : no varicose veins of the right leg [S4] : no S4 [Right Carotid Bruit] : no bruit heard over the right carotid [Left Carotid Bruit] : no bruit heard over the left carotid [Right Femoral Bruit] : no bruit heard over the right femoral artery [Excoriation] : no perianal excoriation [Left Femoral Bruit] : no bruit heard over the left femoral artery [Bruit] : no bruit heard [Internal Hemorrhoid] : internal hemorrhoid was present [External Hemorrhoid] : no external hemorrhoids were present [Thrombosed] : that was not thrombosed [Normal] : was normal [Occult Blood Positive] : was negative for occult blood [Stool Sample Taken] : a stool sample was obtained [Gross Blood] : no gross blood [Fecal Impaction] : no fecal impaction was present [Postauricular Lymph Nodes Enlarged Bilaterally] : nodes not enlarged [Prostate Enlarged] : was enlarged [Penis Abnormality] : normal uncircumcised penis [Preauricular Lymph Nodes Enlarged Bilaterally] : nodes not enlarged [Suboccipital Lymph Nodes Enlarged Bilaterally] : nodes not enlarged [Submandibular Lymph Nodes Enlarged Bilaterally] : nodes not enlarged [Submental Lymph Nodes Enlarged] : nodes not enlarged [Cervical Lymph Nodes Enlarged Posterior Bilaterally] : nodes not enlarged [Cervical Lymph Nodes Enlarged Anterior Bilaterally] : nodes not enlarged [Axillary Lymph Nodes Enlarged Bilaterally] : nodes not enlarged [Epitrochlear Lymph Nodes Enlarged Bilaterally] : nodes not enlarged [Supraclavicular Lymph Nodes Enlarged Bilaterally] : nodes not enlarged [Femoral Lymph Nodes Enlarged Bilaterally] : nodes not enlarged [Inguinal Lymph Nodes Enlarged Bilaterally] : nodes not enlarged [Abnormal Color] : normal color and pigmentation [Skin Turgor Decreased] : normal skin turgor [Skin Lesions 1] : no skin lesions were observed [Impaired judgment] : intact judgment [Impaired Insight] : intact insight [de-identified] : tongue normal teeth in good repair

## 2020-01-22 NOTE — REVIEW OF SYSTEMS
[Recent Weight Gain (___ Lbs)] : recent [unfilled] ~Ulb weight gain [Eyesight Problems] : eyesight problems [Abdominal Pain] : abdominal pain [Heartburn] : heartburn [see HPI] : see HPI [Poor quality erections] : Poor quality erections [Urine retention] : urine retention [Strong urge to urinate] : strong urge to urinate [Bladder pressure] : experiences bladder pressure [Strain or push to urinate] : strain or push to urinate [Slow urine stream] : slow urine stream [Interrupted urine stream] : interrupted urine stream [Bladder fullness after urinating] : bladder fullness after urinating [Dizziness] : dizziness [Negative] : Heme/Lymph

## 2020-01-23 LAB
25(OH)D3 SERPL-MCNC: 33.6 NG/ML
ALBUMIN SERPL ELPH-MCNC: 4.5 G/DL
ALP BLD-CCNC: 62 U/L
ALT SERPL-CCNC: 59 U/L
ANION GAP SERPL CALC-SCNC: 10 MMOL/L
APPEARANCE: CLEAR
AST SERPL-CCNC: 31 U/L
BACTERIA: NEGATIVE
BILIRUB SERPL-MCNC: 0.7 MG/DL
BILIRUBIN URINE: NEGATIVE
BLOOD URINE: NEGATIVE
BUN SERPL-MCNC: 8 MG/DL
CALCIUM SERPL-MCNC: 9.7 MG/DL
CHLORIDE SERPL-SCNC: 103 MMOL/L
CHOLEST SERPL-MCNC: 167 MG/DL
CHOLEST/HDLC SERPL: 4.4 RATIO
CO2 SERPL-SCNC: 29 MMOL/L
COLOR: YELLOW
CREAT SERPL-MCNC: 0.98 MG/DL
ESTIMATED AVERAGE GLUCOSE: 108 MG/DL
FOLATE SERPL-MCNC: >20 NG/ML
FRUCTOSAMINE SERPL-MCNC: 215 UMOL/L
GLUCOSE QUALITATIVE U: NEGATIVE
GLUCOSE SERPL-MCNC: 91 MG/DL
HBA1C MFR BLD HPLC: 5.4 %
HBV CORE IGG+IGM SER QL: NONREACTIVE
HBV SURFACE AB SER QL: NONREACTIVE
HBV SURFACE AG SER QL: NONREACTIVE
HCV AB SER QL: NONREACTIVE
HCV S/CO RATIO: 0.11 S/CO
HDLC SERPL-MCNC: 38 MG/DL
HEPATITIS A IGG ANTIBODY: NONREACTIVE
HIV1+2 AB SPEC QL IA.RAPID: NONREACTIVE
HYALINE CASTS: 2 /LPF
KETONES URINE: NEGATIVE
LDLC SERPL CALC-MCNC: 100 MG/DL
LEUKOCYTE ESTERASE URINE: NEGATIVE
MICROSCOPIC-UA: NORMAL
NITRITE URINE: NEGATIVE
PH URINE: 6
POTASSIUM SERPL-SCNC: 4.4 MMOL/L
PROT SERPL-MCNC: 7.1 G/DL
PROTEIN URINE: NORMAL
RED BLOOD CELLS URINE: 2 /HPF
SODIUM SERPL-SCNC: 142 MMOL/L
SPECIFIC GRAVITY URINE: 1.03
SQUAMOUS EPITHELIAL CELLS: 1 /HPF
T PALLIDUM AB SER QL IA: NEGATIVE
TRIGL SERPL-MCNC: 143 MG/DL
TSH SERPL-ACNC: 2.24 UIU/ML
UROBILINOGEN URINE: NORMAL
VIT B12 SERPL-MCNC: 865 PG/ML
WHITE BLOOD CELLS URINE: 1 /HPF

## 2020-01-24 ENCOUNTER — TRANSCRIPTION ENCOUNTER (OUTPATIENT)
Age: 30
End: 2020-01-24

## 2020-01-25 LAB
M TB IFN-G BLD-IMP: NEGATIVE
QUANTIFERON TB PLUS MITOGEN MINUS NIL: 9.13 IU/ML
QUANTIFERON TB PLUS NIL: 0.02 IU/ML
QUANTIFERON TB PLUS TB1 MINUS NIL: 0.01 IU/ML
QUANTIFERON TB PLUS TB2 MINUS NIL: 0 IU/ML

## 2020-03-04 ENCOUNTER — APPOINTMENT (OUTPATIENT)
Dept: INTERNAL MEDICINE | Facility: CLINIC | Age: 30
End: 2020-03-04

## 2020-03-11 ENCOUNTER — APPOINTMENT (OUTPATIENT)
Dept: UROLOGY | Facility: CLINIC | Age: 30
End: 2020-03-11

## 2020-04-08 ENCOUNTER — APPOINTMENT (OUTPATIENT)
Dept: UROLOGY | Facility: CLINIC | Age: 30
End: 2020-04-08

## 2020-06-01 ENCOUNTER — APPOINTMENT (OUTPATIENT)
Dept: INTERNAL MEDICINE | Facility: CLINIC | Age: 30
End: 2020-06-01
Payer: COMMERCIAL

## 2020-06-01 PROCEDURE — 99214 OFFICE O/P EST MOD 30 MIN: CPT | Mod: 95

## 2020-06-01 NOTE — HISTORY OF PRESENT ILLNESS
[FreeTextEntry1] : Pt was called  and has given phone consent to have a video visit As a response to the Corna virus outbreak we are doing our best to keep pts healthy  . In order to do so we would like to  create your fu appt as a telehealth encounter. Pt is at home and I am in my office at [address] [place] [address] .  The visit is face to face via telehealth.\par  [de-identified] : Pt has been seeing psychologist and told he has adhd and was advised  to take medication.  he has not seen psychiatrist.   he ahs acid reflux and worsens with mile ingestion.  No black stools, no cough, no sore throat, no weight loss . no nausea or vomiting.  he has had problems swallowing he states he feels as if it goes the wrong way and he chokes.  He is taking Motrin a few times a week.

## 2020-06-01 NOTE — ASSESSMENT
[FreeTextEntry1] : dyspepsia  discussed Rule of 2's; pt should avoid eating too much; too fast; too spicy; too lousy; less than two hours before bed \par -Things to avoid including overeating, spicy foods, tight clothing, eating within three hours of bed, this list is not all inclusive. \par -For treatment of reflux, possible options discussed including diet control, H2 blockers, PPIs, as well as coating motility agents discussed as treatment options. Timing of meals and proximity of last meal to sleep were discussed. If symptoms persist, a formal gastrointestinal evaluation is needed. \par  dysphagic will test for eliane and modified barium swallow.  and will not eat and lay down.  start ppi avoid large meals and steaks eat 5 small meals a day.  will test for h pylori \par 3 ADHD refer for psychiatrist. \par 4 ,  elevated alt  stop nsaid,   Risks and benefits were discussed and include but not limited to renal damage and GI ulceration and bleeding. They were advised to take with food to limit stomach upset as well as warned to stop the medication if worsening gastric pain or dizziness or other side effects. Also to immediately stop the medication and seek appropriate medical attention if any severe stomach ache, gastritis, black/red vomit, black/red stools or any other medical concern.\par will  ztart testing.

## 2020-06-01 NOTE — PHYSICAL EXAM
[Normal Voice/Communication] : normal voice/communication [Normal Sclera/Conjunctiva] : normal sclera/conjunctiva [Normal Oropharynx] : the oropharynx was normal [Supple] : supple [No Respiratory Distress] : no respiratory distress  [No Edema] : there was no peripheral edema [No Extremity Clubbing/Cyanosis] : no extremity clubbing/cyanosis [No Rash] : no rash [Normal] : affect was normal and insight and judgment were intact

## 2020-06-03 LAB
A1AT SERPL-MCNC: 109 MG/DL
ALBUMIN SERPL ELPH-MCNC: 4.3 G/DL
ALP BLD-CCNC: 69 U/L
ALT SERPL-CCNC: 50 U/L
AST SERPL-CCNC: 29 U/L
BILIRUB DIRECT SERPL-MCNC: 0.1 MG/DL
BILIRUB INDIRECT SERPL-MCNC: 0.2 MG/DL
BILIRUB SERPL-MCNC: 0.3 MG/DL
CERULOPLASMIN SERPL-MCNC: 19 MG/DL
FERRITIN SERPL-MCNC: 203 NG/ML
IRON SATN MFR SERPL: 20 %
IRON SERPL-MCNC: 54 UG/DL
LKM AB SER QL IF: <20.1 UNITS
PROT SERPL-MCNC: 6.7 G/DL
TIBC SERPL-MCNC: 275 UG/DL
UIBC SERPL-MCNC: 221 UG/DL
UREA BREATH TEST QL: NEGATIVE

## 2020-06-04 ENCOUNTER — TRANSCRIPTION ENCOUNTER (OUTPATIENT)
Age: 30
End: 2020-06-04

## 2020-06-04 LAB
ANA SER IF-ACNC: NEGATIVE
COPPER UR-MCNC: 6 MCG/G CR
MITOCHONDRIA AB SER IF-ACNC: NORMAL
SMOOTH MUSCLE AB SER QL IF: NORMAL

## 2020-06-06 LAB — COPPER SERPL-MCNC: 94 UG/DL

## 2020-06-17 ENCOUNTER — LABORATORY RESULT (OUTPATIENT)
Age: 30
End: 2020-06-17

## 2020-06-17 ENCOUNTER — APPOINTMENT (OUTPATIENT)
Dept: INTERNAL MEDICINE | Facility: CLINIC | Age: 30
End: 2020-06-17
Payer: COMMERCIAL

## 2020-06-17 VITALS
BODY MASS INDEX: 38.04 KG/M2 | HEART RATE: 101 BPM | DIASTOLIC BLOOD PRESSURE: 90 MMHG | WEIGHT: 287 LBS | HEIGHT: 73 IN | TEMPERATURE: 99.1 F | SYSTOLIC BLOOD PRESSURE: 155 MMHG

## 2020-06-17 VITALS — SYSTOLIC BLOOD PRESSURE: 128 MMHG | DIASTOLIC BLOOD PRESSURE: 79 MMHG

## 2020-06-17 DIAGNOSIS — R48.8 OTHER SYMBOLIC DYSFUNCTIONS: ICD-10-CM

## 2020-06-17 DIAGNOSIS — F81.9 DEVELOPMENTAL DISORDER OF SCHOLASTIC SKILLS, UNSPECIFIED: ICD-10-CM

## 2020-06-17 DIAGNOSIS — R41.3 OTHER AMNESIA: ICD-10-CM

## 2020-06-17 DIAGNOSIS — K59.00 CONSTIPATION, UNSPECIFIED: ICD-10-CM

## 2020-06-17 DIAGNOSIS — N50.89 OTHER SPECIFIED DISORDERS OF THE MALE GENITAL ORGANS: ICD-10-CM

## 2020-06-17 DIAGNOSIS — K64.8 OTHER HEMORRHOIDS: ICD-10-CM

## 2020-06-17 PROCEDURE — 99214 OFFICE O/P EST MOD 30 MIN: CPT

## 2020-06-17 RX ORDER — HYDROCORTISONE 25 MG/G
2.5 CREAM TOPICAL
Qty: 2 | Refills: 2 | Status: COMPLETED | COMMUNITY
Start: 2020-01-22 | End: 2020-06-17

## 2020-06-17 NOTE — HEALTH RISK ASSESSMENT
[No] : No [No falls in past year] : Patient reported no falls in the past year [de-identified] : none  [] : No

## 2020-06-17 NOTE — ASSESSMENT
[FreeTextEntry1] : dyspepsia He will start pantoprazole  discussed Rule of 2's; pt should avoid eating too much; too fast; too spicy; too lousy; less than two hours before bed \par -Things to avoid including overeating, spicy foods, tight clothing, eating within three hours of bed, this list is not all inclusive. \par -For treatment of reflux, possible options discussed including diet control, H2 blockers, PPIs, as well as coating motility agents discussed as treatment options. Timing of meals and proximity of last meal to sleep were discussed. If symptoms persist, a formal gastrointestinal evaluation is needed. \par \par weight loss exercise healthy eating \par 2.  oropharyngeal dysphagia  He is to go for appt he didn’t have hpylori eliane is negative.  will do food allergy for eoe .\par 3.  constipation improved  continue high fiber diet increased water intake, exercise.  \par 4.  memory  problems and learning problems will refer him to  neuropsychologist.  \par 5 dyscalculia Common symptoms of dyscalculia are, having difficulty with mental math, trouble analyzing time and reading an analog clock, struggle with motor sequencing that involves numbers, and often they will count on their fingers when adding numbers

## 2020-06-17 NOTE — PHYSICAL EXAM
[Normal Sclera/Conjunctiva] : normal sclera/conjunctiva [PERRL] : pupils equal round and reactive to light [Normal Posterior Cervical Nodes] : no posterior cervical lymphadenopathy [Normal Anterior Cervical Nodes] : no anterior cervical lymphadenopathy [Coordination Grossly Intact] : coordination grossly intact [Normal] : no rash [Normal Mood] : the mood was normal [Normal Gait] : normal gait

## 2020-06-17 NOTE — HISTORY OF PRESENT ILLNESS
[de-identified] : Pt states he continues to have reflux and doesn’t have reflux disease and has not started pantoprazole.  He no longer has constipation.  He was seen by psychologist and was told he needs to be on a stimulant.  Pt states he has memory problems, concentration problems and unable to learn while in school.  he had been left back in school.  he could never learn times table and unable to memorize it.  [FreeTextEntry1] : ADHD gerd.  constipation

## 2020-06-21 LAB
A ALTERNATA IGE QN: <0.1 KUA/L
A FUMIGATUS IGE QN: <0.1 KUA/L
BARLEY IGE QN: <0.1 KUA/L
BERMUDA GRASS IGE QN: <0.1 KUA/L
BOXELDER IGE QN: <0.1 KUA/L
C HERBARUM IGE QN: <0.1 KUA/L
CALIF WALNUT IGE QN: <0.1 KUA/L
CAT DANDER IGE QN: <0.1 KUA/L
CHERRY IGE QN: <0.1 KUA/L
CMN PIGWEED IGE QN: <0.1 KUA/L
CODFISH IGE QN: <0.1 KUA/L
COMMON RAGWEED IGE QN: <0.1 KUA/L
COTTONWOOD IGE QN: <0.1 KUA/L
COW MILK IGE QN: <0.1 KUA/L
CRAB IGE QN: 0.15 KUA/L
CRAB IGE QN: 0.15 KUA/L
D FARINAE IGE QN: 4.02 KUA/L
D PTERONYSS IGE QN: 3.86 KUA/L
DEPRECATED A ALTERNATA IGE RAST QL: 0
DEPRECATED A FUMIGATUS IGE RAST QL: 0
DEPRECATED BARLEY IGE RAST QL: 0
DEPRECATED BERMUDA GRASS IGE RAST QL: 0
DEPRECATED BOXELDER IGE RAST QL: 0
DEPRECATED C HERBARUM IGE RAST QL: 0
DEPRECATED CAT DANDER IGE RAST QL: 0
DEPRECATED CHERRY IGE RAST QL: 0
DEPRECATED CODFISH IGE RAST QL: 0
DEPRECATED COMMON PIGWEED IGE RAST QL: 0
DEPRECATED COMMON RAGWEED IGE RAST QL: 0
DEPRECATED COTTONWOOD IGE RAST QL: 0
DEPRECATED COW MILK IGE RAST QL: 0
DEPRECATED CRAB IGE RAST QL: NORMAL
DEPRECATED CRAB IGE RAST QL: NORMAL
DEPRECATED D FARINAE IGE RAST QL: 3
DEPRECATED D PTERONYSS IGE RAST QL: 3
DEPRECATED DOG DANDER IGE RAST QL: NORMAL
DEPRECATED EGG WHITE IGE RAST QL: 0
DEPRECATED GOOSEFOOT IGE RAST QL: 0
DEPRECATED LOBSTER IGE RAST QL: 0
DEPRECATED LONDON PLANE IGE RAST QL: 0
DEPRECATED MUGWORT IGE RAST QL: 0
DEPRECATED OAT IGE RAST QL: 0
DEPRECATED P NOTATUM IGE RAST QL: NORMAL
DEPRECATED PEANUT IGE RAST QL: 0
DEPRECATED RED CEDAR IGE RAST QL: 0
DEPRECATED ROACH IGE RAST QL: 1
DEPRECATED RYE IGE RAST QL: 0
DEPRECATED SHEEP SORREL IGE RAST QL: 0
DEPRECATED SHRIMP IGE RAST QL: 2
DEPRECATED SILVER BIRCH IGE RAST QL: 0
DEPRECATED SOYBEAN IGE RAST QL: 0
DEPRECATED TIMOTHY IGE RAST QL: 0
DEPRECATED TUNA IGE RAST QL: 0
DEPRECATED WHEAT IGE RAST QL: 0
DEPRECATED WHITE ASH IGE RAST QL: 0
DEPRECATED WHITE OAK IGE RAST QL: 0
DOG DANDER IGE QN: 0.16 KUA/L
EGG WHITE IGE QN: <0.1 KUA/L
GOOSEFOOT IGE QN: <0.1 KUA/L
LOBSTER IGE QN: <0.1 KUA/L
LONDON PLANE IGE QN: <0.1 KUA/L
MUGWORT IGE QN: <0.1 KUA/L
MULBERRY (T70) CLASS: 0
MULBERRY (T70) CONC: <0.1 KUA/L
OAT IGE QN: <0.1 KUA/L
P NOTATUM IGE QN: 0.11 KUA/L
PEANUT IGE QN: <0.1 KUA/L
RED CEDAR IGE QN: <0.1 KUA/L
ROACH IGE QN: 0.48 KUA/L
RYE IGE QN: <0.1 KUA/L
SCALLOP IGE QN: 0.75 KUA/L
SHEEP SORREL IGE QN: <0.1 KUA/L
SILVER BIRCH IGE QN: <0.1 KUA/L
SOYBEAN IGE QN: <0.1 KUA/L
TIMOTHY IGE QN: <0.1 KUA/L
TOTAL IGE SMQN RAST: 110 KU/L
TREE ALLERG MIX1 IGE QL: 0
TUNA IGE QN: <0.1 KUA/L
WHEAT IGE QN: <0.1 KUA/L
WHITE ASH IGE QN: <0.1 KUA/L
WHITE ELM IGE QN: 0
WHITE ELM IGE QN: <0.1 KUA/L
WHITE OAK IGE QN: <0.1 KUA/L

## 2020-08-17 ENCOUNTER — APPOINTMENT (OUTPATIENT)
Dept: ULTRASOUND IMAGING | Facility: IMAGING CENTER | Age: 30
End: 2020-08-17
Payer: COMMERCIAL

## 2020-08-17 ENCOUNTER — OUTPATIENT (OUTPATIENT)
Dept: OUTPATIENT SERVICES | Facility: HOSPITAL | Age: 30
LOS: 1 days | End: 2020-08-17
Payer: COMMERCIAL

## 2020-08-17 ENCOUNTER — RESULT REVIEW (OUTPATIENT)
Age: 30
End: 2020-08-17

## 2020-08-17 DIAGNOSIS — N50.89 OTHER SPECIFIED DISORDERS OF THE MALE GENITAL ORGANS: ICD-10-CM

## 2020-08-17 PROCEDURE — 76700 US EXAM ABDOM COMPLETE: CPT | Mod: 26

## 2020-08-17 PROCEDURE — 76700 US EXAM ABDOM COMPLETE: CPT

## 2020-08-17 PROCEDURE — 76870 US EXAM SCROTUM: CPT | Mod: 26

## 2020-08-17 PROCEDURE — 76870 US EXAM SCROTUM: CPT

## 2020-08-19 ENCOUNTER — APPOINTMENT (OUTPATIENT)
Dept: UROLOGY | Facility: CLINIC | Age: 30
End: 2020-08-19
Payer: COMMERCIAL

## 2020-08-19 DIAGNOSIS — N40.1 BENIGN PROSTATIC HYPERPLASIA WITH LOWER URINARY TRACT SYMPMS: ICD-10-CM

## 2020-08-19 PROCEDURE — 99214 OFFICE O/P EST MOD 30 MIN: CPT

## 2020-08-19 NOTE — ASSESSMENT
[FreeTextEntry1] : Very pleasant 29-year-old gentleman who presents for evaluation of left testicular mass, right spermatocele, increased urinary frequency\par -We discussed potential etiologies of weak urinary stream and increased urinary frequency.  Patient was previously advised to undergo a cystoscopy, however he elected to defer this examination\par -Trial of Flomax\par -I discussed the risks, benefits, alternatives, and possible side effects of alpha blocker therapy with the patient, including but not limited to dizziness, lightheadedness, headache, blurred vision, retrograde ejaculation, and priapism with the patient. I also discussed that the patient must inform his ophthalmologist that he has taken an alpha blocker prior to undergoing cataract or glaucoma surgery.\par -Ultrasound images reviewed with the patient\par -We discussed that I am very concerned about the presence of a left testicular mass and possibility of testicular cancer\par -hCG\par -LDH\par -AFP\par -We discussed the recommendation to perform a radical orchiectomy, however given the small size as well as concern for fertility in the future, he would like to consider a partial orchiectomy if at all possible\par -Follow-up in 1 week\par -We discussed the importance of follow-up at length
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
## 2020-08-20 ENCOUNTER — APPOINTMENT (OUTPATIENT)
Dept: INTERNAL MEDICINE | Facility: CLINIC | Age: 30
End: 2020-08-20
Payer: COMMERCIAL

## 2020-08-20 DIAGNOSIS — R13.12 DYSPHAGIA, OROPHARYNGEAL PHASE: ICD-10-CM

## 2020-08-20 PROCEDURE — 99442: CPT

## 2020-08-20 PROCEDURE — 99441: CPT

## 2020-08-20 NOTE — ASSESSMENT
[FreeTextEntry1] : testicular mass - he will have surgery and will discuss further with Dr yarbrough \sanjuanita Butler.  he has dysphagia and has not been able to have modified barium swallow.    Fatty Liver: The patient denies any jaundice or pruritus. The patient denies any alcohol use. The patient denies taking large doses of nonsteroidal anti-inflammatory drugs or acetaminophen. The findings are suggestive of fatty liver. The patient and I had a long discussion regarding the risks of fatty liver progressing to cirrhosis. The patient was told of the possible increased risk of developing liver failure, cirrhosis, ascites, GI bleeding secondary to varices, hepatic encephalopathy, bleeding tendencies and liver cancer. The patient was told of the importance of follow-up. The patient was advised to follow up every 6 months for blood work and imaging studies. The patient agreed and will follow up. The patient was advised to lose weight. I recommend a trial of vitamin E supplementation for the fatty liver. If the liver enzymes remain elevated, the patient may require a trial of Pioglitazone for the fatty liver. I recommend avoid alcohol and hepato-toxic agents. The patient was also advised to avoid NSAIDs, Acetaminophen and any other hepatotoxic drugs. The patient was also advised not to share needles, razors, scissors, nail clippers, etc.. The patient is to continue close follow-up in our office for blood work and exams. If the liver enzymes remain elevated, the patient may require a CT guided liver biopsy to assess the liver parenchyma and for possible treatment. We had a long discussion regarding the risks and benefits of the procedure. The patient was told of the risks of bleeding, perforation, infections, emergency surgery and missing lesions. The patient agreed and will follow-up to reassess the symptoms.zenon  liver fibroscan which will tel me if there is fibrosis.   he also had order ct with iv contrast and pt has allergies to seafood and should have  Iv steroids prior .   he will make sure he tells radiolgoist beofre .

## 2020-08-20 NOTE — HISTORY OF PRESENT ILLNESS
[FreeTextEntry1] : Pt was called  and has given phone consent to have a Telehealth visit As a response to the Corna virus outbreak we are doing our best to keep pts healthy  . In order to do so we would like to  create your fu appt as a telehealth encounter. Pt is at home and I am in my office at [address] [place] [address] .  The visit is face to face via telehealth.CONSENT  \par Following verbal informed consent that reassured safety, security , privacy of records and gave notice that no part of the video audio communication will be recorded, an audio/video link was established with an approved carrier.\par \par  [de-identified] : pt has left testicular mass suspicious for malignancy and will need removal of the lesion he was advised to have orchiectomy .  heh ad <1 hcg negative alpha feto protein.  he has fatty liver as well  Fatty Liver: The patient denies any jaundice or pruritus. The patient denies any alcohol use. The patient denies taking large doses of nonsteroidal anti-inflammatory drugs or acetaminophen. The findings are suggestive of fatty liver. The patient and I had a long discussion regarding the risks of fatty liver progressing to cirrhosis. The patient was told of the possible increased risk of developing liver failure, cirrhosis, ascites, GI bleeding secondary to varices, hepatic encephalopathy, bleeding tendencies and liver cancer. The patient was told of the importance of follow-up. The patient was advised to follow up every 6 months for blood work and imaging studies. The patient agreed and will follow up. The patient was advised to lose weight. I recommend a trial of vitamin E supplementation for the fatty liver. If the liver enzymes remain elevated, the patient may require a trial of Pioglitazone for the fatty liver. I recommend avoid alcohol and hepato-toxic agents. The patient was also advised to avoid NSAIDs, Acetaminophen and any other hepatotoxic drugs. The patient was also advised not to share needles, razors, scissors, nail clippers, etc.. The patient is to continue close follow-up in our office for blood work and exams. If the liver enzymes remain elevated, the patient may require a CT guided liver biopsy to assess the liver parenchyma and for possible treatment. We had a long discussion regarding the risks and benefits of the procedure. The patient was told of the risks of bleeding, perforation, infections, emergency surgery and missing lesions. The patient agreed and will follow-up to reassess the symptoms.\par \par  He is to have a ct scan to make sure it hasn’t spread.

## 2020-08-22 ENCOUNTER — OUTPATIENT (OUTPATIENT)
Dept: OUTPATIENT SERVICES | Facility: HOSPITAL | Age: 30
LOS: 1 days | End: 2020-08-22
Payer: COMMERCIAL

## 2020-08-22 ENCOUNTER — RESULT REVIEW (OUTPATIENT)
Age: 30
End: 2020-08-22

## 2020-08-22 ENCOUNTER — APPOINTMENT (OUTPATIENT)
Dept: CT IMAGING | Facility: IMAGING CENTER | Age: 30
End: 2020-08-22
Payer: COMMERCIAL

## 2020-08-22 DIAGNOSIS — N50.89 OTHER SPECIFIED DISORDERS OF THE MALE GENITAL ORGANS: ICD-10-CM

## 2020-08-22 LAB
AFP-TM SERPL-MCNC: <1.8 NG/ML
HCG SERPL-MCNC: <1 MIU/ML
LDH SERPL-CCNC: 189 U/L

## 2020-08-22 PROCEDURE — 74177 CT ABD & PELVIS W/CONTRAST: CPT

## 2020-08-22 PROCEDURE — 74177 CT ABD & PELVIS W/CONTRAST: CPT | Mod: 26

## 2020-08-26 ENCOUNTER — APPOINTMENT (OUTPATIENT)
Dept: UROLOGY | Facility: CLINIC | Age: 30
End: 2020-08-26
Payer: COMMERCIAL

## 2020-08-26 DIAGNOSIS — N43.40 SPERMATOCELE OF EPIDIDYMIS, UNSPECIFIED: ICD-10-CM

## 2020-08-26 PROCEDURE — 99214 OFFICE O/P EST MOD 30 MIN: CPT

## 2020-08-26 NOTE — ASSESSMENT
[FreeTextEntry1] : Very pleasant 29-year-old gentleman who presents for follow-up of left testicular mass concerning for cancer\par -We again reviewed results of ultrasound demonstrating a left testicular mass that is suspicious for cancer\par -We reviewed recent CT scan demonstrating no evidence of lymphadenopathy or metastatic disease\par -We reviewed results of labs\par -We discussed options for management moving forward, including orchiectomy, partial orchiectomy with frozen section biopsy at the time of orchiectomy, sono elastography and at this time he would like to proceed with sono elastography for further characterization of the mass\par -We discussed that if sono elastography would demonstrate a firm mass he would need to undergo a left partial versus radical orchiectomy\par -Patient will follow-up at the [facility] is due to for [facility]rology at [facility] for sono elastography

## 2020-08-26 NOTE — PHYSICAL EXAM
[General Appearance - Well Nourished] : well nourished [General Appearance - Well Developed] : well developed [Normal Appearance] : normal appearance [Well Groomed] : well groomed [Abdomen Soft] : soft [General Appearance - In No Acute Distress] : no acute distress [Abdomen Tenderness] : non-tender [Urinary Bladder Findings] : the bladder was normal on palpation [Costovertebral Angle Tenderness] : no ~M costovertebral angle tenderness [Edema] : no peripheral edema [Respiration, Rhythm And Depth] : normal respiratory rhythm and effort [Exaggerated Use Of Accessory Muscles For Inspiration] : no accessory muscle use [] : no respiratory distress [Affect] : the affect was normal [Oriented To Time, Place, And Person] : oriented to person, place, and time [Mood] : the mood was normal [Not Anxious] : not anxious [Normal Station and Gait] : the gait and station were normal for the patient's age [No Focal Deficits] : no focal deficits [No Palpable Adenopathy] : no palpable adenopathy

## 2020-08-26 NOTE — HISTORY OF PRESENT ILLNESS
[FreeTextEntry1] : Very pleasant 29-year-old gentleman who presents for follow-up of left testicular mass.  He previously underwent a scrotal ultrasound which demonstrated an approximately 1 cm left testicular mass which was incidentally noted.  Tumor markers including AFP, hCG, LDH were all negative.  He recently underwent a CT scan which demonstrated no concern for metastatic disease.  He presents today to discuss these results and to discuss further management options. [Urinary Urgency] : no urinary urgency [Urinary Retention] : no urinary retention [Straining] : straining [Weak Stream] : weak stream [Urinary Frequency] : no urinary frequency

## 2020-09-04 ENCOUNTER — OUTPATIENT (OUTPATIENT)
Dept: OUTPATIENT SERVICES | Facility: HOSPITAL | Age: 30
LOS: 1 days | End: 2020-09-04
Payer: COMMERCIAL

## 2020-09-04 ENCOUNTER — APPOINTMENT (OUTPATIENT)
Dept: UROLOGY | Facility: CLINIC | Age: 30
End: 2020-09-04

## 2020-09-04 VITALS — TEMPERATURE: 97.8 F

## 2020-09-04 DIAGNOSIS — R35.0 FREQUENCY OF MICTURITION: ICD-10-CM

## 2020-09-04 DIAGNOSIS — D40.12 NEOPLASM OF UNCERTAIN BEHAVIOR OF LEFT TESTIS: ICD-10-CM

## 2020-09-04 PROCEDURE — 76870 US EXAM SCROTUM: CPT

## 2020-09-08 ENCOUNTER — APPOINTMENT (OUTPATIENT)
Dept: INTERNAL MEDICINE | Facility: CLINIC | Age: 30
End: 2020-09-08

## 2020-10-10 ENCOUNTER — APPOINTMENT (OUTPATIENT)
Dept: DISASTER EMERGENCY | Facility: CLINIC | Age: 30
End: 2020-10-10

## 2020-10-10 DIAGNOSIS — Z01.818 ENCOUNTER FOR OTHER PREPROCEDURAL EXAMINATION: ICD-10-CM

## 2020-10-13 ENCOUNTER — APPOINTMENT (OUTPATIENT)
Dept: UROLOGY | Facility: HOSPITAL | Age: 30
End: 2020-10-13

## 2020-11-02 ENCOUNTER — OUTPATIENT (OUTPATIENT)
Dept: OUTPATIENT SERVICES | Facility: HOSPITAL | Age: 30
LOS: 1 days | End: 2020-11-02
Payer: COMMERCIAL

## 2020-11-02 VITALS
HEIGHT: 73 IN | DIASTOLIC BLOOD PRESSURE: 80 MMHG | WEIGHT: 278 LBS | SYSTOLIC BLOOD PRESSURE: 118 MMHG | TEMPERATURE: 98 F | HEART RATE: 79 BPM | OXYGEN SATURATION: 98 % | RESPIRATION RATE: 18 BRPM

## 2020-11-02 DIAGNOSIS — N50.89 OTHER SPECIFIED DISORDERS OF THE MALE GENITAL ORGANS: ICD-10-CM

## 2020-11-02 LAB
ALBUMIN SERPL ELPH-MCNC: 4.2 G/DL — SIGNIFICANT CHANGE UP (ref 3.3–5)
ALP SERPL-CCNC: 65 U/L — SIGNIFICANT CHANGE UP (ref 40–120)
ALT FLD-CCNC: 39 U/L — SIGNIFICANT CHANGE UP (ref 4–41)
ANION GAP SERPL CALC-SCNC: 7 MMO/L — SIGNIFICANT CHANGE UP (ref 7–14)
AST SERPL-CCNC: 24 U/L — SIGNIFICANT CHANGE UP (ref 4–40)
BILIRUB SERPL-MCNC: 0.4 MG/DL — SIGNIFICANT CHANGE UP (ref 0.2–1.2)
BUN SERPL-MCNC: 13 MG/DL — SIGNIFICANT CHANGE UP (ref 7–23)
CALCIUM SERPL-MCNC: 9.5 MG/DL — SIGNIFICANT CHANGE UP (ref 8.4–10.5)
CHLORIDE SERPL-SCNC: 104 MMOL/L — SIGNIFICANT CHANGE UP (ref 98–107)
CO2 SERPL-SCNC: 31 MMOL/L — SIGNIFICANT CHANGE UP (ref 22–31)
CREAT SERPL-MCNC: 0.91 MG/DL — SIGNIFICANT CHANGE UP (ref 0.5–1.3)
GLUCOSE SERPL-MCNC: 90 MG/DL — SIGNIFICANT CHANGE UP (ref 70–99)
HCT VFR BLD CALC: 48.5 % — SIGNIFICANT CHANGE UP (ref 39–50)
HGB BLD-MCNC: 15.6 G/DL — SIGNIFICANT CHANGE UP (ref 13–17)
MCHC RBC-ENTMCNC: 27.4 PG — SIGNIFICANT CHANGE UP (ref 27–34)
MCHC RBC-ENTMCNC: 32.2 % — SIGNIFICANT CHANGE UP (ref 32–36)
MCV RBC AUTO: 85.2 FL — SIGNIFICANT CHANGE UP (ref 80–100)
NRBC # FLD: 0 K/UL — SIGNIFICANT CHANGE UP (ref 0–0)
PLATELET # BLD AUTO: 249 K/UL — SIGNIFICANT CHANGE UP (ref 150–400)
PMV BLD: 11.6 FL — SIGNIFICANT CHANGE UP (ref 7–13)
POTASSIUM SERPL-MCNC: 3.9 MMOL/L — SIGNIFICANT CHANGE UP (ref 3.5–5.3)
POTASSIUM SERPL-SCNC: 3.9 MMOL/L — SIGNIFICANT CHANGE UP (ref 3.5–5.3)
PROT SERPL-MCNC: 7.2 G/DL — SIGNIFICANT CHANGE UP (ref 6–8.3)
RBC # BLD: 5.69 M/UL — SIGNIFICANT CHANGE UP (ref 4.2–5.8)
RBC # FLD: 12.6 % — SIGNIFICANT CHANGE UP (ref 10.3–14.5)
SODIUM SERPL-SCNC: 142 MMOL/L — SIGNIFICANT CHANGE UP (ref 135–145)
WBC # BLD: 8.91 K/UL — SIGNIFICANT CHANGE UP (ref 3.8–10.5)
WBC # FLD AUTO: 8.91 K/UL — SIGNIFICANT CHANGE UP (ref 3.8–10.5)

## 2020-11-02 PROCEDURE — 93010 ELECTROCARDIOGRAM REPORT: CPT

## 2020-11-02 RX ORDER — SODIUM CHLORIDE 9 MG/ML
1000 INJECTION, SOLUTION INTRAVENOUS
Refills: 0 | Status: DISCONTINUED | OUTPATIENT
Start: 2020-11-06 | End: 2020-11-20

## 2020-11-02 NOTE — H&P PST ADULT - ASSESSMENT
DX: other specified disorders of male genital organs and to be evaluated for a scheduled left partial orchiectomy, possible radical with intra operative US localization on 11/6/20.

## 2020-11-02 NOTE — H&P PST ADULT - NEGATIVE ENMT SYMPTOMS
no recurrent cold sores/no throat pain/no vertigo/no sinus symptoms/no abnormal taste sensation/no gum bleeding/no hearing difficulty/no nasal congestion/no ear pain/no tinnitus/no nasal discharge/no post-nasal discharge/no nose bleeds/no dry mouth/no nasal obstruction

## 2020-11-02 NOTE — H&P PST ADULT - NEGATIVE MALE-SPECIFIC SYMPTOMS
no genital sores/no ejaculatory dysfunction/no urethral discharge/no erectile dysfunction/no impotence

## 2020-11-02 NOTE — H&P PST ADULT - NEGATIVE GASTROINTESTINAL SYMPTOMS
no nausea/no diarrhea/no melena/no jaundice/no steatorrhea/no hiccoughs/no change in bowel habits/no vomiting/no abdominal pain/no flatulence/no constipation

## 2020-11-02 NOTE — H&P PST ADULT - NEGATIVE GENERAL GENITOURINARY SYMPTOMS
no flank pain R/no hematuria/no flank pain L/no urine discoloration/no bladder infections/no dysuria/no nocturia/no renal colic/no incontinence/normal urinary frequency/no urinary hesitancy

## 2020-11-02 NOTE — H&P PST ADULT - HISTORY OF PRESENT ILLNESS
28 y/o M presents to PST w/ a preop dx of other specified disorders of male genital organs and to be evaluated for a scheduled left partial orchiectomy, possible radical with intra operative US localization on 11/6/20. Pt states a cyst to rt testicle and f/u w/ us and incidentally a mass to left testicle was noted. Pt told due to presentation to be suspicious for malignancy so recommended surgical intervention at this time.

## 2020-11-02 NOTE — H&P PST ADULT - NSICDXPASTMEDICALHX_GEN_ALL_CORE_FT
PAST MEDICAL HISTORY:  No pertinent past medical history      PAST MEDICAL HISTORY:  GERD (gastroesophageal reflux disease)     Hemorrhoids     No pertinent past medical history

## 2020-11-02 NOTE — H&P PST ADULT - NEGATIVE CARDIOVASCULAR SYMPTOMS
no paroxysmal nocturnal dyspnea/no peripheral edema/no claudication/no dyspnea on exertion/no orthopnea/no palpitations

## 2020-11-02 NOTE — H&P PST ADULT - CARDIOVASCULAR COMMENTS
veronica gonzales but states pressure at times, unsure if related to dysphagia and gerd. murmur not re evaluated in years

## 2020-11-02 NOTE — H&P PST ADULT - NSICDXPROBLEM_GEN_ALL_CORE_FT
PROBLEM DIAGNOSES  Problem: Other specified disorders of male genital organs  Assessment and Plan: Preop instructions provided including npo status, and pepcid for GI prophylasix. Pt to c/w current meds, ppi prn as ordered, aware to stop any NSAIDS, OTC herbals or MVI on 11/4/20. Verbilized understanding. BW done, pending results. DVT prophylasix as per primary team. Aware to f/u on covid testing prior to sx as per pst protocol and will make appt.

## 2020-11-02 NOTE — H&P PST ADULT - NSANTHOSAYNRD_GEN_A_CORE
Denies sleep studies done in the past/No. RICHIE screening performed.  STOP BANG Legend: 0-2 = LOW Risk; 3-4 = INTERMEDIATE Risk; 5-8 = HIGH Risk

## 2020-11-03 ENCOUNTER — APPOINTMENT (OUTPATIENT)
Dept: DISASTER EMERGENCY | Facility: CLINIC | Age: 30
End: 2020-11-03

## 2020-11-04 LAB — SARS-COV-2 N GENE NPH QL NAA+PROBE: NOT DETECTED

## 2020-11-05 ENCOUNTER — TRANSCRIPTION ENCOUNTER (OUTPATIENT)
Age: 30
End: 2020-11-05

## 2020-11-05 NOTE — ASU PATIENT PROFILE, ADULT - VISION (WITH CORRECTIVE LENSES IF THE PATIENT USUALLY WEARS THEM):
Partially impaired: cannot see medication labels or newsprint, but can see obstacles in path, and the surrounding layout; can count fingers at arm's length wears glasses to drive/Partially impaired: cannot see medication labels or newsprint, but can see obstacles in path, and the surrounding layout; can count fingers at arm's length

## 2020-11-06 ENCOUNTER — OUTPATIENT (OUTPATIENT)
Dept: OUTPATIENT SERVICES | Facility: HOSPITAL | Age: 30
LOS: 1 days | Discharge: HOME | End: 2020-11-06
Payer: COMMERCIAL

## 2020-11-06 ENCOUNTER — RESULT REVIEW (OUTPATIENT)
Age: 30
End: 2020-11-06

## 2020-11-06 ENCOUNTER — APPOINTMENT (OUTPATIENT)
Dept: UROLOGY | Facility: HOSPITAL | Age: 30
End: 2020-11-06

## 2020-11-06 VITALS
DIASTOLIC BLOOD PRESSURE: 77 MMHG | RESPIRATION RATE: 16 BRPM | WEIGHT: 278 LBS | TEMPERATURE: 98 F | OXYGEN SATURATION: 97 % | HEART RATE: 92 BPM | HEIGHT: 73 IN | SYSTOLIC BLOOD PRESSURE: 145 MMHG

## 2020-11-06 VITALS
DIASTOLIC BLOOD PRESSURE: 80 MMHG | RESPIRATION RATE: 15 BRPM | OXYGEN SATURATION: 98 % | HEART RATE: 76 BPM | SYSTOLIC BLOOD PRESSURE: 121 MMHG

## 2020-11-06 DIAGNOSIS — N50.89 OTHER SPECIFIED DISORDERS OF THE MALE GENITAL ORGANS: ICD-10-CM

## 2020-11-06 PROCEDURE — 54530 REMOVAL OF TESTIS: CPT | Mod: LT

## 2020-11-06 PROCEDURE — 88305 TISSUE EXAM BY PATHOLOGIST: CPT | Mod: 26

## 2020-11-06 PROCEDURE — 88331 PATH CONSLTJ SURG 1 BLK 1SPC: CPT | Mod: 26

## 2020-11-06 RX ORDER — OXYCODONE HYDROCHLORIDE 5 MG/1
5 TABLET ORAL ONCE
Refills: 0 | Status: DISCONTINUED | OUTPATIENT
Start: 2020-11-06 | End: 2020-11-06

## 2020-11-06 RX ORDER — PANTOPRAZOLE SODIUM 20 MG/1
1 TABLET, DELAYED RELEASE ORAL
Qty: 0 | Refills: 0 | DISCHARGE

## 2020-11-06 RX ORDER — IBUPROFEN 200 MG
1 TABLET ORAL
Qty: 0 | Refills: 0 | DISCHARGE

## 2020-11-06 RX ORDER — OXYCODONE HYDROCHLORIDE 5 MG/1
1 TABLET ORAL
Qty: 8 | Refills: 0
Start: 2020-11-06

## 2020-11-06 RX ADMIN — SODIUM CHLORIDE 30 MILLILITER(S): 9 INJECTION, SOLUTION INTRAVENOUS at 12:00

## 2020-11-06 RX ADMIN — OXYCODONE HYDROCHLORIDE 5 MILLIGRAM(S): 5 TABLET ORAL at 14:00

## 2020-11-06 RX ADMIN — OXYCODONE HYDROCHLORIDE 5 MILLIGRAM(S): 5 TABLET ORAL at 13:30

## 2020-11-06 NOTE — ASU DISCHARGE PLAN (ADULT/PEDIATRIC) - ASU DC SPECIAL INSTRUCTIONSFT
You may take over the counter pain medicine as needed for pain. Oxycodone has been prescribed for pain unrelieved by these meds.     Please keep dressing dry 2 days. After 2 days you may shower. Remove the dressing 2-3 hours after showering. Leave steristrips underneath in place. They will fall off on their own.  Leave scrotal support in place until you shower. Wear tight-fitting underwear after that for support.  No heavy lifting 2-3 weeks.    Followup with Dr. Contreras at [facility] in 10 days. Please call to confirm/schedule an appointment.

## 2020-11-06 NOTE — ASU DISCHARGE PLAN (ADULT/PEDIATRIC) - CALL YOUR DOCTOR IF YOU HAVE ANY OF THE FOLLOWING:
Fever greater than (need to indicate Fahrenheit or Celsius)/Pain not relieved by Medications/Bleeding that does not stop/Swelling that gets worse/Unable to urinate Unable to urinate/Swelling that gets worse/Fever greater than (need to indicate Fahrenheit or Celsius)/Bleeding that does not stop/Pain not relieved by Medications/Nausea and vomiting that does not stop/Wound/Surgical Site with redness, or foul smelling discharge or pus

## 2020-11-06 NOTE — BRIEF OPERATIVE NOTE - NSICDXBRIEFPROCEDURE_GEN_ALL_CORE_FT
PROCEDURES:  Testicular mass excision, left 06-Nov-2020 11:55:02  Castro Jackson  Left radical orchiectomy by inguinal approach 06-Nov-2020 11:54:39  Castro Jackson

## 2020-11-06 NOTE — ASU DISCHARGE PLAN (ADULT/PEDIATRIC) - CARE PROVIDER_API CALL
Kan Contreras  UROLOGY  [address] [place] [address], [place] [id number]  [place] [id number]  Phone: [phone]  Fax: [phone]  Follow Up Time:

## 2020-11-06 NOTE — ASU DISCHARGE PLAN (ADULT/PEDIATRIC) - NURSING INSTRUCTIONS
DO NOT take any Tylenol (Acetaminophen) or narcotics containing Tylenol until after  5pm . You received Tylenol during your operation and it can cause damage to your liver if too much is taken within a 24 hour time period.

## 2020-11-08 ENCOUNTER — EMERGENCY (EMERGENCY)
Facility: HOSPITAL | Age: 30
LOS: 1 days | Discharge: HOME | End: 2020-11-08
Attending: EMERGENCY MEDICINE | Admitting: EMERGENCY MEDICINE
Payer: COMMERCIAL

## 2020-11-08 ENCOUNTER — NON-APPOINTMENT (OUTPATIENT)
Age: 30
End: 2020-11-08

## 2020-11-08 VITALS
TEMPERATURE: 98 F | HEART RATE: 73 BPM | SYSTOLIC BLOOD PRESSURE: 127 MMHG | HEIGHT: 73 IN | RESPIRATION RATE: 18 BRPM | DIASTOLIC BLOOD PRESSURE: 83 MMHG | OXYGEN SATURATION: 100 %

## 2020-11-08 PROBLEM — K64.9 UNSPECIFIED HEMORRHOIDS: Chronic | Status: ACTIVE | Noted: 2020-11-02

## 2020-11-08 PROBLEM — K21.9 GASTRO-ESOPHAGEAL REFLUX DISEASE WITHOUT ESOPHAGITIS: Chronic | Status: ACTIVE | Noted: 2020-11-02

## 2020-11-08 LAB
ALBUMIN SERPL ELPH-MCNC: 3.9 G/DL — SIGNIFICANT CHANGE UP (ref 3.3–5)
ALP SERPL-CCNC: 57 U/L — SIGNIFICANT CHANGE UP (ref 40–120)
ALT FLD-CCNC: 28 U/L — SIGNIFICANT CHANGE UP (ref 4–41)
ANION GAP SERPL CALC-SCNC: 9 MMO/L — SIGNIFICANT CHANGE UP (ref 7–14)
AST SERPL-CCNC: 17 U/L — SIGNIFICANT CHANGE UP (ref 4–40)
BASOPHILS # BLD AUTO: 0.03 K/UL — SIGNIFICANT CHANGE UP (ref 0–0.2)
BASOPHILS NFR BLD AUTO: 0.3 % — SIGNIFICANT CHANGE UP (ref 0–2)
BILIRUB SERPL-MCNC: 0.5 MG/DL — SIGNIFICANT CHANGE UP (ref 0.2–1.2)
BUN SERPL-MCNC: 10 MG/DL — SIGNIFICANT CHANGE UP (ref 7–23)
CALCIUM SERPL-MCNC: 9.4 MG/DL — SIGNIFICANT CHANGE UP (ref 8.4–10.5)
CHLORIDE SERPL-SCNC: 104 MMOL/L — SIGNIFICANT CHANGE UP (ref 98–107)
CO2 SERPL-SCNC: 26 MMOL/L — SIGNIFICANT CHANGE UP (ref 22–31)
CREAT SERPL-MCNC: 0.87 MG/DL — SIGNIFICANT CHANGE UP (ref 0.5–1.3)
EOSINOPHIL # BLD AUTO: 0.02 K/UL — SIGNIFICANT CHANGE UP (ref 0–0.5)
EOSINOPHIL NFR BLD AUTO: 0.2 % — SIGNIFICANT CHANGE UP (ref 0–6)
GLUCOSE SERPL-MCNC: 106 MG/DL — HIGH (ref 70–99)
HCT VFR BLD CALC: 45.8 % — SIGNIFICANT CHANGE UP (ref 39–50)
HGB BLD-MCNC: 15.2 G/DL — SIGNIFICANT CHANGE UP (ref 13–17)
IMM GRANULOCYTES NFR BLD AUTO: 0.3 % — SIGNIFICANT CHANGE UP (ref 0–1.5)
LYMPHOCYTES # BLD AUTO: 1.12 K/UL — SIGNIFICANT CHANGE UP (ref 1–3.3)
LYMPHOCYTES # BLD AUTO: 12.6 % — LOW (ref 13–44)
MCHC RBC-ENTMCNC: 28.3 PG — SIGNIFICANT CHANGE UP (ref 27–34)
MCHC RBC-ENTMCNC: 33.2 % — SIGNIFICANT CHANGE UP (ref 32–36)
MCV RBC AUTO: 85.1 FL — SIGNIFICANT CHANGE UP (ref 80–100)
MONOCYTES # BLD AUTO: 0.47 K/UL — SIGNIFICANT CHANGE UP (ref 0–0.9)
MONOCYTES NFR BLD AUTO: 5.3 % — SIGNIFICANT CHANGE UP (ref 2–14)
NEUTROPHILS # BLD AUTO: 7.22 K/UL — SIGNIFICANT CHANGE UP (ref 1.8–7.4)
NEUTROPHILS NFR BLD AUTO: 81.3 % — HIGH (ref 43–77)
NRBC # FLD: 0 K/UL — SIGNIFICANT CHANGE UP (ref 0–0)
PLATELET # BLD AUTO: 242 K/UL — SIGNIFICANT CHANGE UP (ref 150–400)
PMV BLD: 11.6 FL — SIGNIFICANT CHANGE UP (ref 7–13)
POTASSIUM SERPL-MCNC: 4 MMOL/L — SIGNIFICANT CHANGE UP (ref 3.5–5.3)
POTASSIUM SERPL-SCNC: 4 MMOL/L — SIGNIFICANT CHANGE UP (ref 3.5–5.3)
PROT SERPL-MCNC: 6.8 G/DL — SIGNIFICANT CHANGE UP (ref 6–8.3)
RBC # BLD: 5.38 M/UL — SIGNIFICANT CHANGE UP (ref 4.2–5.8)
RBC # FLD: 12.6 % — SIGNIFICANT CHANGE UP (ref 10.3–14.5)
SODIUM SERPL-SCNC: 139 MMOL/L — SIGNIFICANT CHANGE UP (ref 135–145)
WBC # BLD: 8.89 K/UL — SIGNIFICANT CHANGE UP (ref 3.8–10.5)
WBC # FLD AUTO: 8.89 K/UL — SIGNIFICANT CHANGE UP (ref 3.8–10.5)

## 2020-11-08 PROCEDURE — 99284 EMERGENCY DEPT VISIT MOD MDM: CPT

## 2020-11-08 RX ORDER — ACETAMINOPHEN 500 MG
975 TABLET ORAL ONCE
Refills: 0 | Status: COMPLETED | OUTPATIENT
Start: 2020-11-08 | End: 2020-11-08

## 2020-11-08 RX ORDER — SODIUM CHLORIDE 9 MG/ML
1000 INJECTION INTRAMUSCULAR; INTRAVENOUS; SUBCUTANEOUS ONCE
Refills: 0 | Status: COMPLETED | OUTPATIENT
Start: 2020-11-08 | End: 2020-11-08

## 2020-11-08 RX ORDER — ONDANSETRON 8 MG/1
1 TABLET, FILM COATED ORAL
Qty: 9 | Refills: 0
Start: 2020-11-08 | End: 2020-11-10

## 2020-11-08 RX ORDER — METOCLOPRAMIDE HCL 10 MG
10 TABLET ORAL ONCE
Refills: 0 | Status: COMPLETED | OUTPATIENT
Start: 2020-11-08 | End: 2020-11-08

## 2020-11-08 RX ADMIN — SODIUM CHLORIDE 1000 MILLILITER(S): 9 INJECTION INTRAMUSCULAR; INTRAVENOUS; SUBCUTANEOUS at 16:23

## 2020-11-08 RX ADMIN — Medication 10 MILLIGRAM(S): at 16:23

## 2020-11-08 NOTE — ED PROVIDER NOTE - PHYSICAL EXAMINATION
back-site of epidural clean, dry, no erythema, non tender, no edema  neck- no midline tenderness. + b/l paraspinal tenderness.

## 2020-11-08 NOTE — ED ADULT NURSE NOTE - CHIEF COMPLAINT QUOTE
Patient had a urology procedure done 2 days ago and has c/o muscle spasms, pain on back of head and ear, an episode of vomiting today. Pt has stitches to left lower abdominal sutures that are painful when he ambulates.

## 2020-11-08 NOTE — ED PROVIDER NOTE - CLINICAL SUMMARY MEDICAL DECISION MAKING FREE TEXT BOX
30 y/o male w/ neck pain and headache s/p epidural x2 days ago- will try and control pain, if unable to, will consult anesthesia for possible blood patch.

## 2020-11-08 NOTE — ED ADULT TRIAGE NOTE - CHIEF COMPLAINT QUOTE
Patient had a procedure done 2 days ago and has c/o muscle spasms, pain on back of head and ear, an episode of vomiting today. Pt has stitches to left lower abdominal sutures that are painful when he ambulates. Patient had a urology procedure done 2 days ago and has c/o muscle spasms, pain on back of head and ear, an episode of vomiting today. Pt has stitches to left lower abdominal sutures that are painful when he ambulates.

## 2020-11-08 NOTE — ED PROVIDER NOTE - OBJECTIVE STATEMENT
28 y/o male pmh testicular mass /o neck pain and headache x1 day. Pt admits to having L orchiectomy x2 days ago. Pt had epidural for anesthesia. Pt states that the day of the procedure he felt fine, but yesterday developed severe neck pain, headache and vomiting. Pt admits to taking motrin and percocet with nor relief. pt denies chest pain, sob, abd pain, diarrhea, numbness, tingling, weakness, dizziness, photophobia, fever or chills.

## 2020-11-08 NOTE — ED ADULT NURSE NOTE - OBJECTIVE STATEMENT
pt s/p same day orchiectomy on 11/6 now c/o b/l neck spasms and headache causing him to vomit from the pain. also states site of epidural is painful but denies bleeding, pus, redness, swelling or warmth. denies fevers or chills. pa assessde

## 2020-11-08 NOTE — ED PROVIDER NOTE - PATIENT PORTAL LINK FT
You can access the FollowMyHealth Patient Portal offered by [facility] by registering at the following website: [email or web address]. By joining [employer]’s FollowMyHealth portal, you will also be able to view your health information using other applications (apps) compatible with our system.

## 2020-11-08 NOTE — ED PROVIDER NOTE - PROGRESS NOTE DETAILS
JONES johnson- Pt feeling better after ER stay, is asking to leave stating that he has a few things he needs to take care of around the house. Pt does not want to wait for his labs to return. Pt is a&Ox3 and capable to make his own medical decisions. stable for dc.

## 2020-11-08 NOTE — ED PROVIDER NOTE - ATTENDING CONTRIBUTION TO CARE
DR. HATHAWAY, ATTENDING MD-  I performed a face to face bedside interview with the patient regarding history of present illness, review of symptoms and past medical history. I completed an independent physical exam.  I have discussed the patient's plan of care with the PA.   Documentation as above in the note.    30 y/o male with recent epidural for urological procedure two days ago here with ha and muscle aches a/w n/v.  No neuro deficits, supple neck.  Likely epidural ha vs viral syndrome, tx with ivf, toradol, reglan, if continued ha will consult anes for blood patch.

## 2020-11-09 ENCOUNTER — NON-APPOINTMENT (OUTPATIENT)
Age: 30
End: 2020-11-09

## 2020-11-09 ENCOUNTER — EMERGENCY (EMERGENCY)
Facility: HOSPITAL | Age: 30
LOS: 1 days | Discharge: HOME | End: 2020-11-09
Attending: STUDENT IN AN ORGANIZED HEALTH CARE EDUCATION/TRAINING PROGRAM | Admitting: STUDENT IN AN ORGANIZED HEALTH CARE EDUCATION/TRAINING PROGRAM
Payer: COMMERCIAL

## 2020-11-09 VITALS
TEMPERATURE: 98 F | OXYGEN SATURATION: 97 % | RESPIRATION RATE: 18 BRPM | HEIGHT: 73 IN | SYSTOLIC BLOOD PRESSURE: 124 MMHG | HEART RATE: 81 BPM | DIASTOLIC BLOOD PRESSURE: 74 MMHG

## 2020-11-09 VITALS — HEART RATE: 72 BPM | DIASTOLIC BLOOD PRESSURE: 60 MMHG | SYSTOLIC BLOOD PRESSURE: 122 MMHG | TEMPERATURE: 99 F

## 2020-11-09 DIAGNOSIS — Z90.79 ACQUIRED ABSENCE OF OTHER GENITAL ORGAN(S): Chronic | ICD-10-CM

## 2020-11-09 LAB
APTT BLD: 28.5 SEC — SIGNIFICANT CHANGE UP (ref 27–36.3)
INR BLD: 1.25 — HIGH (ref 0.88–1.16)
PROTHROM AB SERPL-ACNC: 14.2 SEC — HIGH (ref 10.6–13.6)
SARS-COV-2 RNA SPEC QL NAA+PROBE: SIGNIFICANT CHANGE UP

## 2020-11-09 PROCEDURE — 99284 EMERGENCY DEPT VISIT MOD MDM: CPT

## 2020-11-09 RX ORDER — SODIUM CHLORIDE 9 MG/ML
1000 INJECTION INTRAMUSCULAR; INTRAVENOUS; SUBCUTANEOUS ONCE
Refills: 0 | Status: COMPLETED | OUTPATIENT
Start: 2020-11-09 | End: 2020-11-09

## 2020-11-09 RX ORDER — DIPHENHYDRAMINE HCL 50 MG
25 CAPSULE ORAL ONCE
Refills: 0 | Status: COMPLETED | OUTPATIENT
Start: 2020-11-09 | End: 2020-11-09

## 2020-11-09 RX ORDER — METOCLOPRAMIDE HCL 10 MG
10 TABLET ORAL ONCE
Refills: 0 | Status: COMPLETED | OUTPATIENT
Start: 2020-11-09 | End: 2020-11-09

## 2020-11-09 RX ADMIN — Medication 25 MILLIGRAM(S): at 15:40

## 2020-11-09 RX ADMIN — SODIUM CHLORIDE 1000 MILLILITER(S): 9 INJECTION INTRAMUSCULAR; INTRAVENOUS; SUBCUTANEOUS at 18:48

## 2020-11-09 RX ADMIN — Medication 10 MILLIGRAM(S): at 15:40

## 2020-11-09 RX ADMIN — SODIUM CHLORIDE 1000 MILLILITER(S): 9 INJECTION INTRAMUSCULAR; INTRAVENOUS; SUBCUTANEOUS at 15:40

## 2020-11-09 NOTE — ED PROVIDER NOTE - ATTENDING CONTRIBUTION TO CARE
I performed a history and physical exam of the patient and discussed their management with the resident.  I reviewed the resident's note and agree with the documented findings and plan of care except as noted below. My medical decision making and observations are as follows:    28yo M p/w diffuse HA s/p epidural anesthesia on 11/6 for orchiectomy procedure. Endorses associated symptoms of vomiting x1 yesterday, photosensitivity.  Pt was seen here yesterday for the same, felt some improvement after medication and went home, but now endorsing he actually left due to a panic attack and symptoms persisted once at home.   Reports headache is worse when standing and better when laying down.  Pt with no neuro deficits, ambulating without difficulty.  Will plan for coags, analgesia and anesthesia consult for blood patch for likely post LP headache.

## 2020-11-09 NOTE — ED PROVIDER NOTE - NSFOLLOWUPINSTRUCTIONS_ED_ALL_ED_FT
You have been seen in the ED today for headache, for which you had a blood patch performed by anesthesia.     For pain you can take tylenol 500-650mg every 6 hours or ibuprofen 400-600mg every 6 hours as needed.     If you develop worsened or repeat symptoms, trouble with your vision, fever, difficulty walking, weakness in your legs, loss of sensation to your legs, chest pain, difficulty breathing or any other concerning symptom please return to the ED.

## 2020-11-09 NOTE — ED PROVIDER NOTE - CLINICAL SUMMARY MEDICAL DECISION MAKING FREE TEXT BOX
Patient presents w/HA s/p epidural 11/6 that is positional in nature. Seen here yesterday for the same w/normal labs. Differential includes tension headache, migraine, or post-LP headache which is felt to be most likely. Plan to contact anesthesia for potential blood patch. Will administer reglan and IVF in the meantime. Likely dispo home.

## 2020-11-09 NOTE — CONSULT NOTE ADULT - SUBJECTIVE AND OBJECTIVE BOX
Pt is a 28yo male with no significant past medical hx, who presented to ED for the second time in two days for what is concerning for PDPH. Pt underwent radical partial orchiectomy under spinal on 10/06. Endorses associated symptoms of vomiting x1 yesterday, photosensitivity, and nausea. HA is improved by laying flat and worsened by sitting/standing up. Denies fever/chills, changes in vision, abdominal pain, focal deficits, paresthesias, or further symptoms. States he's had HA in the past, but this is different. On 10/08 he was seen in the ED, he was given 1L fluid for hydration, reglan and benadryl with improving symptoms. However, this morning he endorse the HA getting worse being a 10/10 standing 6/10 laying flat. Also state tylenol and motrin do not help. Given this Anesthesia was consulted for epidural blood patch.    Physical Exam  CV: RRR  Resp: CTA bl equal chest rise  Neuro: A&Ox3, no neuro deficits, strength 5/5 lower extremities.      Anesthesia was consulted for epidural blood patch. Pt states he is not able to do daily activities and would prefer having a blood patch today vs trying more conservative measures. It was discussed with the patient the risks and benefits of epidural blood patch. He understood, the risk of infection, bleeding, damage to surrounding structures and potential worsening of HA.    -Pt was consented for epidural blood patch.   -Procedure was done under sterile conditions  -12ml of blood sterilely drawn blood was injected into the epidural space   -See procedure note for details  -Please call anesthesia pain service for any questions    Elzbieta Waterman MD  Attending Anesthesiologist

## 2020-11-09 NOTE — ED ADULT NURSE REASSESSMENT NOTE - NS ED NURSE REASSESS COMMENT FT1
Pt awake and alert x 3 co headache , states headache improved after he was placed lying flat.  pt medicated as ordered, pt/ .inr drawn pt with no vomiting  . pt vs wnl pt is afebrile awaiting dipo.

## 2020-11-09 NOTE — ED PROVIDER NOTE - OBJECTIVE STATEMENT
30yo M p/w diffuse HA s/p epidural on 11/6 for orchiectomy procedure. Endorses associated symptoms of vomiting x1 yesterday, photosensitivity, and nausea. HA is improved by laying flat and worsened by sitting/standing up. Denies fever/chills, changes in vision, abdominal pain, focal deficits, paresthesias, or further symptoms. Has a history of headaches in the past but this is different 2/2 persistence of symptoms and postural nature.

## 2020-11-09 NOTE — PROGRESS NOTE ADULT - SUBJECTIVE AND OBJECTIVE BOX
Monitors were placed on the patient EKG, NIBP, and Pulse ox. Vitals were stable through out procedure (82. 138/78, 100% on RA)  Consent obtained. Time out performed   Procedure started at 1720, End 1745  Pt was sat a the edge of the bed  Back was prep with betadine and sterile drape was placed  Lumbar level L4-5 was localized and 3ml of 1% lidocaine was used for local anesthesia  17G Touhy was inserted via midline approach  Loss of resistance was obtained at 8cm  Meanwhile 12cc of sterile blood were drawn by Dr Serrato  The sterile blood drawn was subsequently given via Touhy into the epidural space  Pt was laid flat after the procedure  He is to remain Flat for 2hrs     Elzbieta Waterman MD  Attending Anesthesiologist

## 2020-11-09 NOTE — ED CDU PROVIDER INITIAL DAY NOTE - ATTENDING CONTRIBUTION TO CARE
DR. BLOCH, ATTENDING MD-  I performed a face to face bedside interview with patient regarding history of present illness, review of symptoms and past medical history. I completed an independent physical exam.  I have discussed patient's plan of care with the resident.  Patient well appearing PERRL full eom, no nystagmus, neck supple, neg pain on neck rotation, heart sounds nml lungs clear, abd soft nontender, left groin with bandage, decreased sensation left ant thigh and left scrotum, no erythema no heat, mild swelling left groin above inguinal ligament.neuro motor 5/5sensation intact except for area mentioned.

## 2020-11-09 NOTE — ED CDU PROVIDER INITIAL DAY NOTE - MEDICAL DECISION MAKING DETAILS
Pt is a 28 y/o M former smoker PMHx Left orchiectomy on 11/6/20 p/w headache x 3 days -- likely post LP headache -- anesthesiology to perform blood patch

## 2020-11-09 NOTE — ED PROVIDER NOTE - PATIENT PORTAL LINK FT
You can access the FollowMyHealth Patient Portal offered by [facility] by registering at the following website: [email or web address]. By joining [employer]’s FollowMyHealth portal, you will also be able to view your health information using other applications (apps) compatible with our system.

## 2020-11-09 NOTE — ED CDU PROVIDER INITIAL DAY NOTE - PHYSICAL EXAMINATION
No nuchal rigidity.    :  Left inguinal surgical site with clean dry dressing intact.  No redness, warmth, or crepitus.  No scrotal swelling, redness or warmth.  (Chaperone:  Helen Bloch MD)

## 2020-11-09 NOTE — ED ADULT TRIAGE NOTE - CHIEF COMPLAINT QUOTE
Pt had surgery on his back on 11/6/2020 and received epi-dural injection, started to develop headache on 11/7/2020, pt endorses photosensitivity, + n/v, breathing even and unlabored.

## 2020-11-09 NOTE — ED PROVIDER NOTE - PROGRESS NOTE DETAILS
Anesthesia to bedside, discussed options with patient to include blood patch. Patient given Fioricet per anesthesia recommendations. Patient elected for blood patch procedure to be performed by anesthesia. Pt feels much better after blood patch.  He is now able to stand and walk without recurrence of the headache.  Will discharge home with strict return precautions.  - Magdalena Winters,

## 2020-11-10 ENCOUNTER — EMERGENCY (EMERGENCY)
Facility: HOSPITAL | Age: 30
LOS: 1 days | Discharge: HOME | End: 2020-11-10
Attending: EMERGENCY MEDICINE | Admitting: EMERGENCY MEDICINE
Payer: COMMERCIAL

## 2020-11-10 VITALS
SYSTOLIC BLOOD PRESSURE: 144 MMHG | TEMPERATURE: 99 F | RESPIRATION RATE: 18 BRPM | DIASTOLIC BLOOD PRESSURE: 81 MMHG | HEART RATE: 65 BPM | OXYGEN SATURATION: 100 %

## 2020-11-10 VITALS
TEMPERATURE: 98 F | SYSTOLIC BLOOD PRESSURE: 131 MMHG | OXYGEN SATURATION: 100 % | DIASTOLIC BLOOD PRESSURE: 86 MMHG | RESPIRATION RATE: 18 BRPM | HEIGHT: 73 IN | HEART RATE: 77 BPM

## 2020-11-10 DIAGNOSIS — Z90.79 ACQUIRED ABSENCE OF OTHER GENITAL ORGAN(S): Chronic | ICD-10-CM

## 2020-11-10 PROCEDURE — 99282 EMERGENCY DEPT VISIT SF MDM: CPT

## 2020-11-10 NOTE — ED PROVIDER NOTE - NEUROLOGICAL, MLM
Alert and oriented, no focal deficits, no motor or sensory deficits. No pronator drift. No dysmetria. No gait instability.

## 2020-11-10 NOTE — ED PROVIDER NOTE - CLINICAL SUMMARY MEDICAL DECISION MAKING FREE TEXT BOX
28 y/o M former smoker PMHx Left orchiectomy on 11/6/20, s/p blood patch 11/9/20 p/w L sided facial paresthesia. No neuro deficits. 28 y/o M former smoker PMHx Left orchiectomy on 11/6/20, s/p blood patch 11/9/20 p/w L sided facial paresthesia. No neuro deficits.  plan  - neuro consult.

## 2020-11-10 NOTE — ED ADULT TRIAGE NOTE - CHIEF COMPLAINT QUOTE
pt c/o tingling to left side of face upon waking up this am.  pt is s/p blood patch yesterday.  pt had orchietomy on 11/6 which is also when he got an epidural

## 2020-11-10 NOTE — CONSULT NOTE ADULT - ASSESSMENT
28 y/o male s/p orchiectomy POD 4, s/p blood patch POD 1 c/o tingling to left of mouth below lip.  Patient had surgery w epidural, developed hypotension headache, tx w blood patch and HA improving.  Starting two hours after the blood patch he developed tingling below and to the left of his lower lip, which has been improving but still present at time of exam.  No vertigo, lightheadedness, loc, visual sxs, cp, sob, fever, cough.  Normal neurological exam.  Improving sxs. Unclear etiology.      Recommendation:  - if sxs persist fu w neurology clinic   28 y/o male s/p orchiectomy POD 4, s/p blood patch POD 1 c/o tingling to left of mouth below lip.  Patient had surgery w epidural, developed hypotension headache, tx w blood patch and HA improving.  Starting two hours after the blood patch he developed tingling below and to the left of his lower lip, which has been improving but still present at time of exam.  No vertigo, lightheadedness, loc, visual sxs, cp, sob, fever, cough.  Normal neurological exam.  Improving sxs. Unclear etiology. Doubt stroke, seizure, or inflammatory etiology.      Recommendation:  - if sxs persist fu w neurology clinic

## 2020-11-10 NOTE — CONSULT NOTE ADULT - SUBJECTIVE AND OBJECTIVE BOX
HPI:    30 y/o male s/p orchiectomy POD 4, s/p blood patch POD 1 c/o tingling to left of mouth below lip.  Patient had surgery w epidural, developed hypotension headache, tx w blood patch and HA improving.  Starting two hours after the blood patch he developed tingling below and to the left of his lower lip, which has been improving but still present at time of exam.  No vertigo, lightheadedness, loc, visual sxs, cp, sob, fever, cough.    REVIEW OF SYSTEMS  General:	  Skin/Breast:	  Ophthalmologic:  ENMT:	  Respiratory and Thorax:	  Cardiovascular:	  Gastrointestinal:	  Genitourinary:	  Musculoskeletal:	  Neurological:	  Psychiatric:	  Hematology/Lymphatics:	  Endocrine:	  Allergic/Immunologic:	    A 10-system ROS was performed and is negative except for those items noted above and/or in the HPI.    PAST MEDICAL & SURGICAL HISTORY:  GERD (gastroesophageal reflux disease)    Hemorrhoids    No pertinent past medical history    S/P orchiectomy      FAMILY HISTORY:  No pertinent family history in first degree relatives      SOCIAL HISTORY:   T/E/D:   Occupation:   Lives with:     MEDICATIONS (HOME):  Home Medications:  Motrin 300 mg oral tablet: 1 tab(s) orally once a day (06 Nov 2020 08:37)  pantoprazole 20 mg oral delayed release tablet: 1 tab(s) orally once a day (06 Nov 2020 08:37)    MEDICATIONS  (STANDING):    MEDICATIONS  (PRN):    ALLERGIES/INTOLERANCES:  Allergies  penicillin (Rash)  shellfish (Anaphylaxis)    Intolerances    VITALS & EXAMINATION:  Vital Signs Last 24 Hrs  T(C): 37.1 (10 Nov 2020 22:14), Max: 37.1 (10 Nov 2020 22:14)  T(F): 98.8 (10 Nov 2020 22:14), Max: 98.8 (10 Nov 2020 22:14)  HR: 65 (10 Nov 2020 22:14) (65 - 77)  BP: 144/81 (10 Nov 2020 22:14) (131/86 - 144/81)  BP(mean): --  RR: 18 (10 Nov 2020 22:14) (18 - 18)  SpO2: 100% (10 Nov 2020 22:14) (100% - 100%)    Neurological Exam    Mental Status: Oriented to self, place and date.  Attention intact.  Comprehension, expression, prosidy, and articulation of speech grossly intact.  Registration intact.  Recent and remote memory grossly intact.      Cranial Nerves: PERRL, EOMI, VFF, no nystagmus or diplopia.  CN V1-3 intact to light touch.  No facial asymmetry.  Hearing intact.  Tongue midline.  Sternocleidomastoid and Trapezius intact bilaterally.    Motor:     Tone: normal              Strength:       Upper extremity                      Delt       Bicep    Tricep                                               R         5/5 5/5 5/5 5/5                                            L          5/5 5/5 5/5 5/5  Lower extremity                       HF          KE          KF        DF         PF                                            R        5/5 5/5 5/5 5/5 5/5                                            L         5/5 5/5 5/5 5/5 5/5    Pronator drift: none                   Dysmetria: None to finger-nose-finger or heel-shin-heel    No truncal ataxia.      Tremor: No resting, postural or action tremor.  No myoclonus.    Sensation: intact to light touch    Deep Tendon Reflexes: 2+ bilateral biceps, triceps, brachioradialis, knee and ankle    Toes down bilaterally    Gait: normal and stable.          LABORATORY:  CBC   Chem       LFTs   Coagulopathy PT/INR - ( 09 Nov 2020 15:42 )   PT: 14.2 SEC;   INR: 1.25          PTT - ( 09 Nov 2020 15:42 )  PTT:28.5 SEC  Lipid Panel   A1c   Cardiac enzymes     U/A   CSF  Immunological  Other    STUDIES & IMAGING:

## 2020-11-10 NOTE — ED PROVIDER NOTE - PROGRESS NOTE DETAILS
Neuro evaluated., no further imaging needed at this time. Advised pt to follow up in Neuro clinic. Given strict precautions for return. Pt verbalizes agreement and understanding. VSS.

## 2020-11-10 NOTE — ED PROVIDER NOTE - PATIENT PORTAL LINK FT
You can access the FollowMyHealth Patient Portal offered by [facility] by registering at the following website: [email or web address]. By joining [employer]’s FollowMyHealth portal, you will also be able to view your health information using other applications (apps) compatible with our system.

## 2020-11-10 NOTE — ED PROVIDER NOTE - OBJECTIVE STATEMENT
28 y/o M former smoker PMHx Left orchiectomy with epidural on 11/6/20, s/p blood patch 11/9/20 p/w L perioral facial tingling x today. Pt reports mild pins and needles sensation to L side of face, mostly rosmery-oral area. Pt states headache has greatly improved s/p blood patch, still has mild headache. Pt denies focal weakness. No loss of sensation. No dizziness. No fever, chills, chest pain, sob, cough, abd pain, n/v/d. 30 y/o M former smoker PMHx Left orchiectomy with epidural on 11/6/20, s/p blood patch 11/9/20 p/w L perioral facial tingling x today. Pt states tingling started 2 hours after procedure, but has been more significant today. Pt reports pins and needles sensation to L side of face, mostly rosmery-oral area. Pt states headache has greatly improved s/p blood patch, still has mild headache. Pt denies focal weakness. No loss of sensation. No dizziness. No fever, chills, chest pain, sob, cough, abd pain, n/v/d.

## 2020-11-12 LAB — SURGICAL PATHOLOGY STUDY: SIGNIFICANT CHANGE UP

## 2020-11-13 ENCOUNTER — APPOINTMENT (OUTPATIENT)
Dept: NEUROLOGY | Facility: CLINIC | Age: 30
End: 2020-11-13
Payer: COMMERCIAL

## 2020-11-13 ENCOUNTER — EMERGENCY (EMERGENCY)
Facility: HOSPITAL | Age: 30
LOS: 1 days | Discharge: HOME | End: 2020-11-13
Attending: EMERGENCY MEDICINE
Payer: COMMERCIAL

## 2020-11-13 VITALS
HEIGHT: 73 IN | SYSTOLIC BLOOD PRESSURE: 122 MMHG | RESPIRATION RATE: 18 BRPM | BODY MASS INDEX: 36.98 KG/M2 | WEIGHT: 279 LBS | HEART RATE: 71 BPM | DIASTOLIC BLOOD PRESSURE: 84 MMHG

## 2020-11-13 VITALS
DIASTOLIC BLOOD PRESSURE: 81 MMHG | WEIGHT: 279.11 LBS | RESPIRATION RATE: 16 BRPM | HEART RATE: 90 BPM | OXYGEN SATURATION: 98 % | SYSTOLIC BLOOD PRESSURE: 138 MMHG | TEMPERATURE: 99 F | HEIGHT: 73 IN

## 2020-11-13 DIAGNOSIS — Z87.19 PERSONAL HISTORY OF OTHER DISEASES OF THE DIGESTIVE SYSTEM: ICD-10-CM

## 2020-11-13 DIAGNOSIS — Z90.79 ACQUIRED ABSENCE OF OTHER GENITAL ORGAN(S): Chronic | ICD-10-CM

## 2020-11-13 DIAGNOSIS — Z86.79 PERSONAL HISTORY OF OTHER DISEASES OF THE CIRCULATORY SYSTEM: ICD-10-CM

## 2020-11-13 DIAGNOSIS — G97.1 OTHER REACTION TO SPINAL AND LUMBAR PUNCTURE: ICD-10-CM

## 2020-11-13 PROCEDURE — 99203 OFFICE O/P NEW LOW 30 MIN: CPT

## 2020-11-13 PROCEDURE — 99285 EMERGENCY DEPT VISIT HI MDM: CPT

## 2020-11-13 RX ORDER — WITCH HAZEL 50 %
50 PADS, MEDICATED (EA) TOPICAL
Qty: 1 | Refills: 0 | Status: DISCONTINUED | COMMUNITY
Start: 2020-01-22 | End: 2020-11-13

## 2020-11-13 RX ORDER — TAMSULOSIN HYDROCHLORIDE 0.4 MG/1
0.4 CAPSULE ORAL
Qty: 30 | Refills: 1 | Status: DISCONTINUED | COMMUNITY
Start: 2020-08-19 | End: 2020-11-13

## 2020-11-13 NOTE — ED PROVIDER NOTE - PMH
GERD (gastroesophageal reflux disease)    Hemorrhoids    Memory difficulties    Testicular tumor

## 2020-11-13 NOTE — ED ADULT NURSE NOTE - OBJECTIVE STATEMENT
29M to ED to ED states that he was sent to ED by his neurologist for tingling to the L side of his face. 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 Patient states that on the 7th he had a headache/neck pain and vomiting. Then on the 8th he went to the ER for the headache and was given a blood patch which helped with the symptoms. Patient states that on that day, the 8th, he began to have tingling to just his Left lip. Patient states that on the next day, the 9th, the tingling spread to most of the L side of his face.  Patient states that he was evaluated in the ER and was told that he was not having a stroke at that time. Patient states that he was told to follow up with neurology. Today the 13th he had his neurology appointment and was told to come to the ER to get an MRI. Patient c/o mild SOB. Patient denies fever, chest pain, cough. patient is given the call bell and verbalizes understanding of how to use it. Patient is alert and oriented x4, calm/cooperative, NAD, VSS. 29M to ED to ED states that he was sent to ED by his neurologist for tingling to the L side of his face. 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 Patient states that on the 7th he had a headache/neck pain and vomiting. Then on the 8th he went to the ER for the headache and was given a blood patch which helped with the symptoms. Patient states that on that day, the 8th, he began to have tingling to just his Left lip. Patient states that on the next day, the 9th, the tingling spread to most of the L side of his face.  Patient states that he was evaluated in the ER and was told that he was not having a stroke at that time. Patient states that he was told to follow up with neurology. Today the 13th he had his neurology appointment and was told to come to the ER to get an MRI. Patient c/o mild SOB. Patient denies fever, chest pain, cough. patient denies reddness/swelling to the surgical site.  patient is given the call bell and verbalizes understanding of how to use it. Patient is alert and oriented x4, calm/cooperative, NAD, VSS.

## 2020-11-13 NOTE — ED PROVIDER NOTE - PHYSICAL EXAMINATION
General: Awake, alert, cooperative with exam and in no acute distress.  HEENT: Atraumatic/normocephalic. No rhinorrhea. Moist mucus membranes.  Respiratory: Clear to auscultation bilaterally without increased work of breathing.  Cardiac: Regular rate and rhythm without murmur.  Abdomen: Soft, non-tender, and non-distended.  Extremities: Normal range of motion. Warm and well-perfused.  Skin: No apparent rashes or lesions on visible skin.  Neurologic: + difference in sensation to touch on LEFT versus right: "tingling" when touched on left forehead compared to right, left jaw compared to right, left arm compared to right, and left leg compared to right. CN 2-12 intact. No facial nerve palsy. Normal 5/5 strength in bilateral upper and lower extremities. Normal coordination with bilateral finger to nose testing. Normal gait.

## 2020-11-13 NOTE — ED PROVIDER NOTE - OBJECTIVE STATEMENT
Ray is a 29-year-old young man referred in from neurology clinic for imaging. He had an orchiectomy on 11/6 (POD7) complicated by a hypotension headache requiring a blood patch on 11/9 (POD4). He was evaluated in the ED 3 days ago. Ray is a 29-year-old young man referred in from neurology clinic for imaging. He had an orchiectomy on 11/6 (POD7) complicated by a hypotension headache requiring a blood patch on 11/9 (POD4). He was evaluated in the [facility] ED 3 days ago for left-sided tingling sensations that started in his left lower lip and migrated to his left jaw, left arm, and left leg.    He was evaluated in the outpatient setting today by Dr. Peck at [address] [place] [address]. He reported that for 5 minutes this morning, he experienced full body tingling, but then any other minutes-long episodes have only occurred on his left side. He denies any other symptoms or change in gait. Dr. Peck referred him to the ED for MRI brain with/without. Ray is a 29-year-old young man referred in from neurology clinic for imaging. He had an orchiectomy on 11/6 (POD7) complicated by a hypotension headache requiring a blood patch on 11/9 (POD4). He was evaluated in the [facility] ED 3 days ago for left-sided tingling sensations that started in his left lower lip and migrated to his left jaw, left arm, and left leg.    He was evaluated in the outpatient setting today by Dr. Peck at [address] [place] [address]. He reported that for 5 minutes this morning, he experienced full body tingling, but then any other minutes-long episodes have only occurred on his left side. He denies any other symptoms or change in gait. He also reported that he had mild short-term memory difficulties over the past 2-3 years. Exam was notable for decreased light touch/pinprick on the entire left side of the face, left arm, and left leg. Otherwise he had normal visual acuity, visual fields, bilateral hearing. He had no tongue deviation, negative Romberg, 2+ DTRs throughout, and normal muscle bulk/tone.    Dr. Peck referred him to the ED for MRI brain with/without.

## 2020-11-13 NOTE — ED PROVIDER NOTE - CLINICAL SUMMARY MEDICAL DECISION MAKING FREE TEXT BOX
29-year-old man POD7 from orchiectomy complicated by hypotension headache requiring blood patch (POD4) then associated with left sided jaw, upper/lower extremity "tingling." Evaluated by outpatient neurology who recommended ED visit for MRI brain. On exam, the patient is well appearing. His neurological exam is normal except for different sensation to touch on LEFT versus right. Plan to consult neurology team to determine further disposition, as MRI will likely need to be an inpatient procedure. - Kely Conner MD, PEM fellow 29-year-old man POD7 from orchiectomy complicated by hypotension headache requiring blood patch (POD4) then associated with left sided jaw, upper/lower extremity "tingling." Evaluated by outpatient neurology who recommended ED visit for MRI brain. On exam, the patient is well appearing. His neurological exam is normal except for different sensation to touch on LEFT versus right. Plan to consult neurology team to determine further disposition, as MRI will likely need to be an inpatient procedure. - Kely Conner MD, [facility] fellow    Ruth Cook MD - Attending Physician: Pt here with facial tingling, Left arm/leg involvement. No motor involvement. Sent in by Neuro. Exam benign and nonfocal. No deficits. Neuro c/s for plan

## 2020-11-13 NOTE — ED PROVIDER NOTE - PROGRESS NOTE DETAILS
Pt refusing IV at this time. Aware IV needed for MRI or any further labs/meds. Pt understands. Agreeable with [facility] for labs. Awaiting Neuro eval Pt now refusing all bloodwork until seen by Neuro

## 2020-11-14 VITALS
TEMPERATURE: 98 F | OXYGEN SATURATION: 100 % | DIASTOLIC BLOOD PRESSURE: 82 MMHG | SYSTOLIC BLOOD PRESSURE: 138 MMHG | RESPIRATION RATE: 18 BRPM | HEART RATE: 78 BPM

## 2020-11-14 LAB
A1C WITH ESTIMATED AVERAGE GLUCOSE RESULT: 5.4 % — SIGNIFICANT CHANGE UP (ref 4–5.6)
ALBUMIN SERPL ELPH-MCNC: 4.1 G/DL — SIGNIFICANT CHANGE UP (ref 3.3–5)
ALP SERPL-CCNC: 62 U/L — SIGNIFICANT CHANGE UP (ref 40–120)
ALT FLD-CCNC: 36 U/L — SIGNIFICANT CHANGE UP (ref 10–45)
ANION GAP SERPL CALC-SCNC: 9 MMOL/L — SIGNIFICANT CHANGE UP (ref 5–17)
AST SERPL-CCNC: 23 U/L — SIGNIFICANT CHANGE UP (ref 10–40)
BASOPHILS # BLD AUTO: 0.04 K/UL — SIGNIFICANT CHANGE UP (ref 0–0.2)
BASOPHILS NFR BLD AUTO: 0.5 % — SIGNIFICANT CHANGE UP (ref 0–2)
BILIRUB SERPL-MCNC: 0.4 MG/DL — SIGNIFICANT CHANGE UP (ref 0.2–1.2)
BUN SERPL-MCNC: 15 MG/DL — SIGNIFICANT CHANGE UP (ref 7–23)
CALCIUM SERPL-MCNC: 9.2 MG/DL — SIGNIFICANT CHANGE UP (ref 8.4–10.5)
CHLORIDE SERPL-SCNC: 104 MMOL/L — SIGNIFICANT CHANGE UP (ref 96–108)
CHOLEST SERPL-MCNC: 144 MG/DL — SIGNIFICANT CHANGE UP
CO2 SERPL-SCNC: 26 MMOL/L — SIGNIFICANT CHANGE UP (ref 22–31)
CREAT SERPL-MCNC: 0.92 MG/DL — SIGNIFICANT CHANGE UP (ref 0.5–1.3)
EOSINOPHIL # BLD AUTO: 0.13 K/UL — SIGNIFICANT CHANGE UP (ref 0–0.5)
EOSINOPHIL NFR BLD AUTO: 1.5 % — SIGNIFICANT CHANGE UP (ref 0–6)
ESTIMATED AVERAGE GLUCOSE: 108 MG/DL — SIGNIFICANT CHANGE UP (ref 68–114)
GLUCOSE SERPL-MCNC: 97 MG/DL — SIGNIFICANT CHANGE UP (ref 70–99)
HCT VFR BLD CALC: 47.2 % — SIGNIFICANT CHANGE UP (ref 39–50)
HDLC SERPL-MCNC: 29 MG/DL — LOW
HGB BLD-MCNC: 15.7 G/DL — SIGNIFICANT CHANGE UP (ref 13–17)
IMM GRANULOCYTES NFR BLD AUTO: 0.4 % — SIGNIFICANT CHANGE UP (ref 0–1.5)
LIPID PNL WITH DIRECT LDL SERPL: 95 MG/DL — SIGNIFICANT CHANGE UP
LYMPHOCYTES # BLD AUTO: 2.02 K/UL — SIGNIFICANT CHANGE UP (ref 1–3.3)
LYMPHOCYTES # BLD AUTO: 23.8 % — SIGNIFICANT CHANGE UP (ref 13–44)
MCHC RBC-ENTMCNC: 28.8 PG — SIGNIFICANT CHANGE UP (ref 27–34)
MCHC RBC-ENTMCNC: 33.3 GM/DL — SIGNIFICANT CHANGE UP (ref 32–36)
MCV RBC AUTO: 86.6 FL — SIGNIFICANT CHANGE UP (ref 80–100)
MONOCYTES # BLD AUTO: 0.64 K/UL — SIGNIFICANT CHANGE UP (ref 0–0.9)
MONOCYTES NFR BLD AUTO: 7.5 % — SIGNIFICANT CHANGE UP (ref 2–14)
NEUTROPHILS # BLD AUTO: 5.64 K/UL — SIGNIFICANT CHANGE UP (ref 1.8–7.4)
NEUTROPHILS NFR BLD AUTO: 66.3 % — SIGNIFICANT CHANGE UP (ref 43–77)
NON HDL CHOLESTEROL: 115 MG/DL — SIGNIFICANT CHANGE UP
NRBC # BLD: 0 /100 WBCS — SIGNIFICANT CHANGE UP (ref 0–0)
PLATELET # BLD AUTO: 274 K/UL — SIGNIFICANT CHANGE UP (ref 150–400)
POTASSIUM SERPL-MCNC: 3.7 MMOL/L — SIGNIFICANT CHANGE UP (ref 3.5–5.3)
POTASSIUM SERPL-SCNC: 3.7 MMOL/L — SIGNIFICANT CHANGE UP (ref 3.5–5.3)
PROT SERPL-MCNC: 6.9 G/DL — SIGNIFICANT CHANGE UP (ref 6–8.3)
RBC # BLD: 5.45 M/UL — SIGNIFICANT CHANGE UP (ref 4.2–5.8)
RBC # FLD: 12.5 % — SIGNIFICANT CHANGE UP (ref 10.3–14.5)
SARS-COV-2 RNA SPEC QL NAA+PROBE: SIGNIFICANT CHANGE UP
SODIUM SERPL-SCNC: 139 MMOL/L — SIGNIFICANT CHANGE UP (ref 135–145)
TRIGL SERPL-MCNC: 97 MG/DL — SIGNIFICANT CHANGE UP
WBC # BLD: 8.5 K/UL — SIGNIFICANT CHANGE UP (ref 3.8–10.5)
WBC # FLD AUTO: 8.5 K/UL — SIGNIFICANT CHANGE UP (ref 3.8–10.5)

## 2020-11-14 PROCEDURE — U0003: CPT

## 2020-11-14 PROCEDURE — 99242 OFF/OP CONSLTJ NEW/EST SF 20: CPT

## 2020-11-14 PROCEDURE — 70553 MRI BRAIN STEM W/O & W/DYE: CPT

## 2020-11-14 PROCEDURE — 99284 EMERGENCY DEPT VISIT MOD MDM: CPT | Mod: 25

## 2020-11-14 PROCEDURE — 99236 HOSP IP/OBS SAME DATE HI 85: CPT

## 2020-11-14 PROCEDURE — 80061 LIPID PANEL: CPT

## 2020-11-14 PROCEDURE — 70553 MRI BRAIN STEM W/O & W/DYE: CPT | Mod: 26

## 2020-11-14 PROCEDURE — 80053 COMPREHEN METABOLIC PANEL: CPT

## 2020-11-14 PROCEDURE — 85025 COMPLETE CBC W/AUTO DIFF WBC: CPT

## 2020-11-14 PROCEDURE — G0378: CPT

## 2020-11-14 PROCEDURE — 83036 HEMOGLOBIN GLYCOSYLATED A1C: CPT

## 2020-11-14 PROCEDURE — A9585: CPT

## 2020-11-14 NOTE — ED ADULT NURSE REASSESSMENT NOTE - COMFORT CARE
ambulated to bathroom/warm blanket provided/darkened lights/plan of care explained/po fluids offered/repositioned

## 2020-11-14 NOTE — ED CDU PROVIDER DISPOSITION NOTE - PATIENT PORTAL LINK FT
You can access the FollowMyHealth Patient Portal offered by [facility] by registering at the following website: [email or web address]. By joining [employer]s FollowMyHealth portal, you will also be able to view your health information using other applications (apps) compatible with our system.

## 2020-11-14 NOTE — ED CDU PROVIDER INITIAL DAY NOTE - MEDICAL DECISION MAKING DETAILS
Attending Jayshree:  29-year-old man POD7 from orchiectomy complicated by hypotension headache requiring blood patch (POD4) then associated with left sided jaw, upper/lower extremity "tingling." Evaluated by outpatient neurology who recommended ED visit for MRI brain. Pt came to ED had normal neuro exam w/ equal power, no cn deficits, good cerebellar function. Seen by neuro recommended CDU for MRI

## 2020-11-14 NOTE — ED CDU PROVIDER DISPOSITION NOTE - NSFOLLOWUPINSTRUCTIONS_ED_ALL_ED_FT
Follow up with your primary care doctor within 1 week    Follow up with Neurology in 1-5 days - see attached contact info.  *Bring all attached lab/test results.*    Stay well hydrated.  Continue current home medications    Read stroke education material given to you.    Return if symptoms worsen, fever, weakness, numbness, dizziness, vision change, slurred speech and all other concerns Follow up with your primary care doctor within 1 week    Follow up with Neurology Dr. Peck in 1-5 days - [address] [place] [address] ([phone])  *Bring all attached lab/test results.*    Stay well hydrated.  Continue current home medications    Return if symptoms worsen, fever, weakness, numbness, dizziness, vision change, slurred speech and all other concerns

## 2020-11-14 NOTE — ED CDU PROVIDER INITIAL DAY NOTE - OBJECTIVE STATEMENT
Ray is a 29-year-old young man referred in from neurology clinic for imaging. He had an orchiectomy on 11/6 (POD7) complicated by a hypotension headache requiring a blood patch on 11/9 (POD4). He was evaluated in the [facility] ED 3 days ago for left-sided tingling sensations that started in his left lower lip and migrated to his left jaw, left arm, and left leg.  He was evaluated in the outpatient setting today by Dr. Peck at [address] [place] [address]. He reported that for 5 minutes this morning, he experienced full body tingling, but then any other minutes-long episodes have only occurred on his left side. He denies any other symptoms or change in gait. He also reported that he had mild short-term memory difficulties over the past 2-3 years. Exam was notable for decreased light touch/pinprick on the entire left side of the face, left arm, and left leg. Otherwise he had normal visual acuity, visual fields, bilateral hearing. He had no tongue deviation, negative Romberg, 2+ DTRs throughout, and normal muscle bulk/tone.  Dr. Peck referred him to the ED for MRI brain with/without.  In ED labs unrevealing. pt was seen by neurology who recommended MRI head to rule out acute neurologic process

## 2020-11-14 NOTE — ED CDU PROVIDER DISPOSITION NOTE - ATTENDING CONTRIBUTION TO CARE
sent to cdu for MRI, unlikely stroke w/ parethesias also in differential MS, vs demyleniating disease  MRI unremarkable, pt tolerating PO ambulatory w/out difficulty will follow up w/ neurology, pt states he feels comfortable to go hoome, intermittent paresthesias around the L side of the lips, no other neuro deficit on my exam,

## 2020-11-14 NOTE — ED CDU PROVIDER DISPOSITION NOTE - NS ED MD EM OBS STAY GREATER 8
POST-OP DIAGNOSIS:  Bilateral inguinal hernia 15-Aug-2019 09:57:18  Lior Gabriel
[address]10 - Moderate Complexity

## 2020-11-14 NOTE — ED CDU PROVIDER DISPOSITION NOTE - CLINICAL COURSE
Ray is a 29-year-old young man referred in from neurology clinic for imaging. He had an orchiectomy on 11/6 (POD7) complicated by a hypotension headache requiring a blood patch on 11/9 (POD4). He was evaluated in the [facility] ED 3 days ago for left-sided tingling sensations that started in his left lower lip and migrated to his left jaw, left arm, and left leg.  	He was evaluated in the outpatient setting today by Dr. Peck at [address] [place] [address]. He reported that for 5 minutes this morning, he experienced full body tingling, but then any other minutes-long episodes have only occurred on his left side. He denies any other symptoms or change in gait. He also reported that he had mild short-term memory difficulties over the past 2-3 years. Exam was notable for decreased light touch/pinprick on the entire left side of the face, left arm, and left leg. Otherwise he had normal visual acuity, visual fields, bilateral hearing. He had no tongue deviation, negative Romberg, 2+ DTRs throughout, and normal muscle bulk/tone.  	Dr. Peck referred him to the ED for MRI brain with/without.  In ED labs unrevealing. pt was seen by neurology who recommended MRI head to rule out acute neurologic process  In CDU, Ray is a 29-year-old young man referred in from neurology clinic for imaging. He had an orchiectomy on 11/6 (POD7) complicated by a hypotension headache requiring a blood patch on 11/9 (POD4). He was evaluated in the [facility] ED 3 days ago for left-sided tingling sensations that started in his left lower lip and migrated to his left jaw, left arm, and left leg.  	He was evaluated in the outpatient setting today by Dr. Peck at [address] [place] [address]. He reported that for 5 minutes this morning, he experienced full body tingling, but then any other minutes-long episodes have only occurred on his left side. He denies any other symptoms or change in gait. He also reported that he had mild short-term memory difficulties over the past 2-3 years. Exam was notable for decreased light touch/pinprick on the entire left side of the face, left arm, and left leg. Otherwise he had normal visual acuity, visual fields, bilateral hearing. He had no tongue deviation, negative Romberg, 2+ DTRs throughout, and normal muscle bulk/tone.  	Dr. Peck referred him to the ED for MRI brain with/without.  In ED labs unrevealing. pt was seen by neurology who recommended MRI head to rule out acute neurologic process  In CDU, pt with nmo change in symptoms, no worsening numbness, tingling, weakness, paresthesias. MRI unremarkable, seen by Neuro who recommended further outpt management this week. D/w Dr. Nj.

## 2020-11-14 NOTE — CONSULT NOTE ADULT - ATTENDING COMMENTS
Patient seen and examined with neurology team this AM (11/14).    He presented to ED with left facial, hand and foot paresthesias since 11/9, after a spinal blood patch procedure 11/9 for spinal headache s/s to recent spinal anaesthesia for orchidectomy 11/6.    He reports tingling sensation over lower half of left face (~ V2/V3 distribution) and intermittently in left hand and foot. No similar symptoms in the past. Symptoms in extremities are intermittent.     MRI brain with/without pending-will follow with result.  Neurological exam is normal    Imp: left sided paresthesias, uncertain of etiology at this time. Will follow MRI results.

## 2020-11-14 NOTE — CONSULT NOTE ADULT - SUBJECTIVE AND OBJECTIVE BOX
Neurology  Consult Note  11-14-20    Name:  NOEL OCONNELL; 29y (1990)    Neurology consult: 30yo man with PMH of orchiectomy (11/06), s/p blood patch (11/09), presents to the ED with L-sided tingling for the past 4 days. Patient reports that after he received the epidural he developed a severe headache. He was found to have developed hypotension headache for which he was treated with a blood patch. The headache improved significantly, however he began to develop tingling in the left side of his lip/mouth which has progressed to the rest of his face, then arm, and leg. Patient reports mild R lip tingling but denies tingling anywhere else on the R side. He reports soreness of his L forearm and the back of his L thigh, and mild difficulties with memory, but denies weakness, visual disturbance, urinary or bowel incontinence, vertigo, loss of hearing, changes in speech, fever or recent illness. Patient was evaluated at [facility] 3 days prior to presentation for the same symptoms, was seen by Dr. Peck at [address] [place] [address] on the day of presentation, and was sent to the ED for further imaging.    Review of Systems:  As states in HPI.    penicillin (Rash)  shellfish (Anaphylaxis)      PMHx:   Testicular tumor    Memory difficulties    GERD (gastroesophageal reflux disease)    Hemorrhoids    No pertinent past medical history      PFHx:   No pertinent family history in first degree relatives      PSuHx:   S/P orchiectomy    No significant past surgical history      Vitals:  T(C): 36.9 (11-14-20 @ 03:03), Max: 37.1 (11-13-20 @ 19:03)  HR: 72 (11-14-20 @ 03:03) (72 - 90)  BP: 126/79 (11-14-20 @ 03:03) (125/80 - 138/81)  RR: 18 (11-14-20 @ 03:03) (16 - 18)  SpO2: 98% (11-14-20 @ 03:03) (96% - 98%)    Physical Examination:  Neurologic:  - Mental Status: Alert, awake, oriented to person, place, and time; Speech is fluent with intact naming, repetition, and comprehension, no dysarthria.  - Cranial Nerves:  II: Visual fields are full to confrontation; Pupils are equal, round, and reactive to light  III, IV, VI: Extraocular movements are intact without nystagmus.  V: Facial sensation is intact in the V1-V3 distribution bilaterally, reports subjective paresthesias along the left V1-V3 to palpation  VII: Face is symmetric with normal eye closure and smile.  VIII: Hearing is intact to finger rub.  IX, X: Uvula is midline and soft palate rises symmetrically.  XI: Head turning and shoulder shrug are intact. No nuchal rigidity.  XII: Tongue protrudes in the midline.  - Motor: Strength is 5/5 throughout. There is no pronator drift.  - Reflexes: 2+ and symmetric at the biceps, 1+ knees, no clonus at the ankles. Plantar responses down bilaterally.  - Sensory: Increased paresthesias to light touch throughout the L extremities, but remains intact throughout to light touch, pin prick, vibration, and joint-position.  - Coordination: Finger-nose-finger intact without dysmetria.  - Gait: Normal steps, base, arm swing, and turning. Romberg testing is negative.    Labs:                        15.7   8.50  )-----------( 274      ( 14 Nov 2020 02:16 )             47.2     11-14    139  |  104  |  15  ----------------------------<  97  3.7   |  26  |  0.92    Ca    9.2      14 Nov 2020 02:16    TPro  6.9  /  Alb  4.1  /  TBili  0.4  /  DBili  x   /  AST  23  /  ALT  36  /  AlkPhos  62  11-14    CAPILLARY BLOOD GLUCOSE        LIVER FUNCTIONS - ( 14 Nov 2020 02:16 )  Alb: 4.1 g/dL / Pro: 6.9 g/dL / ALK PHOS: 62 U/L / ALT: 36 U/L / AST: 23 U/L / GGT: x               Radiology:  Pending

## 2020-11-14 NOTE — CONSULT NOTE ADULT - ASSESSMENT
28yo man with PMH of orchiectomy 1wk ago, s/p blood patch 4 days ago, presents to the ED with L-sided tingling for the past 4 days. Patient was evaluated at [facility] 3 days prior to presentation, was seen by Dr. Peck at [address] [place] [address] on the day of presentation, and was sent to the ED for further imaging. Physical exam notable for paresthesias, but otherwise no focal neurological deficit. Labs grossly WNL.    Impression: L cuca-paresthesias after surgery and blood patch, possibly due to hypotensive event, r/o ischemia vs. subdural collection, however the ddx is wide and includes complex migraine vs. mechanical nerve compression, although the short duration of the procedure and length of distribution makes this less likely vs. metabolic deficiency vs. inflammatory reaction.    Recommendations:  [] MR brain w/ w/o contrast for further evaluation  [] Vit B12, folate, TFTs, ESR, CRP  [] Further management pending imaging results  [] If persistent, can consider EMG/NC studies as outpatient  [] Follow-up in Neurology clinic with Dr. Peck at [address] [place] [address] ([phone]) upon discharge for further management and care    Case to be seen and discussed with neurology attending Dr. Cespedes.

## 2020-11-14 NOTE — ED ADULT NURSE REASSESSMENT NOTE - NS ED NURSE REASSESS COMMENT FT1
Neurology at bedside.
Pt came back from MRI, awake and alert. Denies any pain, tingling or discomfort. Will continue to reassess. OOB to bathroom with steady gait.
Pt refused all bloodwork and covid swab until seen by neurologist. Pt educated on risks and benefits. MD Cook aware and at bedside to discuss. Awaiting neurology team consult.
Received pt from night RN Julee, received pt alert and responsive, oriented x4, denies any SOB, HA, pain. Pt denies numbness, blurred/ double vision. Pending MRI. Pt aware of plan. IV in place, patent and free of signs of infiltration, pt denies chest pain or palpitations, V/S stable, pt afebrile, pt denies pain at this time. Pt educated on unit and unit rules, instructed patient to notify RN of any needed assistance, Pt verbalizes understanding, Call bell placed within reach. Safety maintained. Will continue to monitor.
Received pt from [facility] N[facility], received pt alert and responsive, oriented x4, denies any respiratory distress, SOB, or difficulty breathing. Pt transferred to CDU for tingling to L face, rosmery-oral region. Pt denies numbness, blurred/ double vision. Pending MRI in AM, pt aware of plan. On telemetry pt is SR hr: 70's.  IV in place, patent and free of signs of infiltration, pt denies chest pain or palpitations, V/S stable, pt afebrile, pt denies pain at this time. Pt educated on unit and unit rules, instructed patient to notify RN of any needed assistance, Pt verbalizes understanding, Call bell placed within reach. Safety maintained. Will continue to monitor.

## 2020-11-14 NOTE — ED CDU PROVIDER INITIAL DAY NOTE - PROGRESS NOTE DETAILS
CDU PROGRESS NOTE JONES STEINER: Pt resting comfortably without complaint. neuro exam as earlier with +L sided facial tingling. NAD. VSS. no events on tele. plan for MRI in am. Will continue to monitor. Pt resting comfortably. NAD. No complaints. VSS. no events on tele, still c/o left sided facial numbness, however no new symptoms, no new numbness, tingling, weakness, slurred speech. sensations and strength intact, CN 2-12 intact.

## 2020-11-14 NOTE — ED ADULT NURSE REASSESSMENT NOTE - GLASGOW COMA SCALE
Outpatient Physical Therapy Ortho Treatment Note/Discharge Summary  [facility]     Patient Name: Abbie Pisano  : 1949  MRN: [id number]  Ovidio Maciel MD;  Location: [facility] Molly Bryan PTA Physical Therapy Assistant                      Molly Bryan PTA Physical Therapy Assistant              Molly Bryan PTA Physical Therapy Assistant                         Molly Bryan PTA Physical Therapy Assistant          Molly Zamora PTA  6/27/2019        baseline

## 2020-11-16 ENCOUNTER — APPOINTMENT (OUTPATIENT)
Dept: NEUROLOGY | Facility: CLINIC | Age: 30
End: 2020-11-16

## 2020-11-16 ENCOUNTER — NON-APPOINTMENT (OUTPATIENT)
Age: 30
End: 2020-11-16

## 2020-11-17 ENCOUNTER — APPOINTMENT (OUTPATIENT)
Dept: UROLOGY | Facility: CLINIC | Age: 30
End: 2020-11-17
Payer: COMMERCIAL

## 2020-11-17 ENCOUNTER — APPOINTMENT (OUTPATIENT)
Dept: NEUROLOGY | Facility: CLINIC | Age: 30
End: 2020-11-17
Payer: COMMERCIAL

## 2020-11-17 VITALS — SYSTOLIC BLOOD PRESSURE: 142 MMHG | HEART RATE: 88 BPM | DIASTOLIC BLOOD PRESSURE: 91 MMHG | RESPIRATION RATE: 18 BRPM

## 2020-11-17 VITALS — TEMPERATURE: 97.2 F

## 2020-11-17 VITALS — TEMPERATURE: 97.7 F

## 2020-11-17 DIAGNOSIS — N50.89 OTHER SPECIFIED DISORDERS OF THE MALE GENITAL ORGANS: ICD-10-CM

## 2020-11-17 PROBLEM — D49.59 NEOPLASM OF UNSPECIFIED BEHAVIOR OF OTHER GENITOURINARY ORGAN: Chronic | Status: ACTIVE | Noted: 2020-11-13

## 2020-11-17 PROBLEM — R41.3 OTHER AMNESIA: Chronic | Status: ACTIVE | Noted: 2020-11-13

## 2020-11-17 PROCEDURE — 99214 OFFICE O/P EST MOD 30 MIN: CPT

## 2020-11-17 PROCEDURE — 99024 POSTOP FOLLOW-UP VISIT: CPT

## 2020-11-17 RX ORDER — CLINDAMYCIN HYDROCHLORIDE 150 MG/1
150 CAPSULE ORAL
Qty: 21 | Refills: 0 | Status: DISCONTINUED | COMMUNITY
Start: 2020-11-08

## 2020-11-17 RX ORDER — ONDANSETRON 4 MG/1
4 TABLET, ORALLY DISINTEGRATING ORAL
Qty: 9 | Refills: 0 | Status: DISCONTINUED | COMMUNITY
Start: 2020-11-08

## 2020-11-17 NOTE — REVIEW OF SYSTEMS
[Arm Weakness] : arm weakness [Leg Weakness] : leg weakness [Numbness] : numbness [Tingling] : tingling

## 2020-11-17 NOTE — PHYSICAL EXAM
[FreeTextEntry1] : Constitutional:  Patient was well-developed, well-nourished and in no acute distress. \par \par Head:  Normocephalic, atraumatic. Tympanic membranes were clear. \par \par Neck:  Supple with full range of motion. \par \par Cardiovascular:  Cardiac rhythm was regular without murmur. There were no carotid bruits. Peripheral pulses were full and symmetric. \par \par Respiratory:  Lungs were clear. \par \par Abdomen:  Soft and nontender. \par \par Spine: There was mild neck and mid lumbar tenderness.  There was pain on straight leg raising at 30 degrees bilaterally.  Alex's maneuver was negative.\par \par Skin:  There were no rashes. \par \par NEUROLOGICAL EXAMINATION:\par \par Mental Status: He was alert and oriented. Speech was fluent. There was no dysarthria. \par \par Cranial Nerves: \par \par II: Visual acuity was 20/ 20 bilaterally with near card. Pupils were equal and reactive. Visual fields were full. Funduscopic examination was normal. \par \par III, IV, VI:  Eye movements were full without nystagmus. \par \par V: Facial sensation was intact. \par \par VII: Facial strength was normal. \par \par VIII: Hearing was equal. \par \par IX, X: Palatal movement was normal. Phonation was normal. \par \par XI: Sternocleidomastoids and trapezii were normal. \par \par XII: Tongue was midline and movements normal. There was no lingual atrophy or fasciculations. \par \par Motor Examination: Muscle bulk, tone and strength were normal. \par \par Sensory Examination: Pinprick, vibration and joint position sense were intact. \par \par Reflexes: DTRs were 2+ throughout. \par \par Plantar Responses: Plantar responses were flexor. \par \par Coordination/Cerebellar Function: There was no dysmetria on finger to nose or heel to shin testing. \par \par Gait/Stance: Gait and tandem were normal. Romberg was negative.\par

## 2020-11-17 NOTE — HISTORY OF PRESENT ILLNESS
[FreeTextEntry1] : Mr. OCONNELL returned to the office having been initially evaluated on November 13, 2020.  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  He developed subsequent postural headache  prompting treatment with blood patch on November 9th.  He subsequently developed left jaw, arm and leg tingling and mild  motor symptoms.\par \par He was referred to the emergency room where he underwent an MRI of the brain on November 13th.  That study was unremarkable.\par \par He continues to experience mild left arm weakness, mild tingling of his left hand and leg, neck pressure, mild mid low back pain radiating to his left buttock and posterior thigh and today mild penile numbness.  His left jaw tingling improved.

## 2020-11-17 NOTE — PHYSICAL EXAM
[FreeTextEntry1] : Constitutional:  Patient was well-developed, well-nourished and in no acute distress. \par \par Head:  Normocephalic, atraumatic. Tympanic membranes were clear. \par \par Neck:  Supple with full range of motion. \par \par Cardiovascular:  Cardiac rhythm was regular without murmur. There were no carotid bruits. Peripheral pulses were full and symmetric. \par \par Respiratory:  Lungs were clear. \par \par Abdomen:  Soft and nontender. \par \par Spine: There was mild neck and mid lumbar tenderness.  There was pain on straight leg raising at 30 degrees bilaterally.  Alex's maneuver was negative.\par \par Skin:  There were no rashes. \par \par NEUROLOGICAL EXAMINATION:\par \par Mental Status: He was alert and oriented. Speech was fluent. There was no dysarthria. \par \par Cranial Nerves: \par \par II: Visual acuity was 20/ 20 bilaterally with near card. Pupils were equal and reactive. Visual fields were full. Funduscopic examination was normal. \par \par III, IV, VI:  Eye movements were full without nystagmus. \par \par V: Facial sensation was intact. \par \par VII: Facial strength was normal. \par \par VIII: Hearing was equal. \par \par IX, X: Palatal movement was normal. Phonation was normal. \par \par XI: Sternocleidomastoids and trapezii were normal. \par \par XII: Tongue was midline and movements normal. There was no lingual atrophy or fasciculations. \par \par Motor Examination: Muscle bulk, tone and strength were normal. \par \par Sensory Examination: Pinprick, vibration and joint position sense were intact. \par \par Reflexes: DTRs were 2+ throughout. \par \par Plantar Responses: Plantar responses were flexor. \par \par Coordination/Cerebellar Function: There was no dysmetria on finger to nose or heel to shin testing. \par \par Gait/Stance: Gait and tandem were normal. Romberg was negative.\par  Mart-1 - Negative Histology Text: MART-1 staining demonstrates a normal density and pattern of melanocytes along the dermal-epidermal junction. The surgical margins are negative for tumor cells.

## 2020-11-17 NOTE — CONSULT LETTER
[Dear  ___] : Dear  [unfilled], [Consult Letter:] : I had the pleasure of evaluating your patient, [unfilled]. [Please see my note below.] : Please see my note below. [Consult Closing:] : Thank you very much for allowing me to participate in the care of this patient.  If you have any questions, please do not hesitate to contact me. [Sincerely,] : Sincerely, [FreeTextEntry3] : Sy Peck MD\par

## 2020-11-17 NOTE — ASSESSMENT
[FreeTextEntry1] : Mr. 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  He subsequently experienced left-sided sensory and motor symptoms.  MRI of the brain with and without contrast performed on November 14 was unremarkable.  He returns because of persistent left sided sensorimotor complaints, neck and lumbar discomfort.\par \par The cause of Mr. Correa's symptoms remains uncertain.  There was no evidence of dural enhancement or thickening on MRI which might lead one to suspect persistent intracranial hypotension.  Because of his symptoms, I suggested that he undergo MRIs of the cervical and lumbar spine without contrast.  He will follow up with his urologist Dr. Chino Butler later today.  Further management will depend upon the MRI results and his clinical course.

## 2020-11-17 NOTE — REVIEW OF SYSTEMS
[Feeling Poorly] : feeling poorly [Hesitancy] : urinary hesitancy [Arm Weakness] : arm weakness [Leg Weakness] : leg weakness [Numbness] : numbness [Tingling] : tingling

## 2020-11-18 ENCOUNTER — APPOINTMENT (OUTPATIENT)
Dept: INTERNAL MEDICINE | Facility: CLINIC | Age: 30
End: 2020-11-18
Payer: COMMERCIAL

## 2020-11-18 DIAGNOSIS — R74.0 NONSPECIFIC ELEVATION OF LEVELS OF TRANSAMINASE AND LACTIC ACID DEHYDROGENASE [LDH]: ICD-10-CM

## 2020-11-18 DIAGNOSIS — F41.0 PANIC DISORDER [EPISODIC PAROXYSMAL ANXIETY]: ICD-10-CM

## 2020-11-18 PROCEDURE — 99442: CPT

## 2020-11-19 ENCOUNTER — APPOINTMENT (OUTPATIENT)
Dept: INTERNAL MEDICINE | Facility: CLINIC | Age: 30
End: 2020-11-19
Payer: COMMERCIAL

## 2020-11-19 VITALS
SYSTOLIC BLOOD PRESSURE: 126 MMHG | TEMPERATURE: 97.3 F | HEART RATE: 73 BPM | WEIGHT: 262 LBS | HEIGHT: 73 IN | OXYGEN SATURATION: 100 % | BODY MASS INDEX: 34.72 KG/M2 | DIASTOLIC BLOOD PRESSURE: 83 MMHG

## 2020-11-19 DIAGNOSIS — F41.9 ANXIETY DISORDER, UNSPECIFIED: ICD-10-CM

## 2020-11-19 DIAGNOSIS — R06.02 SHORTNESS OF BREATH: ICD-10-CM

## 2020-11-19 DIAGNOSIS — G47.9 SLEEP DISORDER, UNSPECIFIED: ICD-10-CM

## 2020-11-19 DIAGNOSIS — R00.2 PALPITATIONS: ICD-10-CM

## 2020-11-19 DIAGNOSIS — R35.0 FREQUENCY OF MICTURITION: ICD-10-CM

## 2020-11-19 PROCEDURE — 93000 ELECTROCARDIOGRAM COMPLETE: CPT

## 2020-11-19 PROCEDURE — 99215 OFFICE O/P EST HI 40 MIN: CPT | Mod: 25

## 2020-11-19 RX ORDER — OXYCODONE HYDROCHLORIDE 5 MG/1
CAPSULE ORAL
Refills: 0 | Status: COMPLETED | COMMUNITY
End: 2020-11-19

## 2020-11-19 NOTE — PHYSICAL EXAM
[Well Developed] : well developed [Well Nourished] : well nourished [Conjunctiva] : the conjunctiva were normal in both eyes [PERRL] : pupils were equal in size, round, and reactive to light [EOM Intact] : extraocular movements were intact [Rate ___] : at [unfilled] breaths per minute [Normal Rhythm/Effort] : normal respiratory rhythm and effort [Clear Bilaterally] : the lungs were clear to auscultation bilaterally [Normal to Percussion] : the lungs were normal to percussion [Rhythm Regular] : regular [Normal Rate] : normal [Normal S1] : normal S1 [Normal S2] : normal S2 [No Murmur] : no murmurs heard [No Pitting Edema] : no pitting edema present [2+] : left 2+ [No Abnormalities] : the abdominal aorta was not enlarged and no bruit was heard [Soft, Nontender] : the abdomen was soft and nontender [No Mass] : no masses were palpated [No HSM] : no hepatosplenomegaly noted [Normal Station and Gait] : the gait and station were normal [Normal Motor Tone] : the muscle tone was normal [Involuntary Movements] : no involuntary movements were seen [Complexion Medium] : medium complexion [Normal] : the deep tendon reflexes were normal [Alert and Oriented x3] : oriented to person, place, and time [Normal Insight/Judgement] : insight and judgment were intact [Rt] : no varicose veins of the right leg [Lt] : no varicose veins of the left leg [Bruit] : no bruit heard [S3] : no S3 [S4] : no S4 [Right Carotid Bruit] : no bruit heard over the right carotid [Left Carotid Bruit] : no bruit heard over the left carotid [Right Femoral Bruit] : no bruit heard over the right femoral artery [Left Femoral Bruit] : no bruit heard over the left femoral artery [Abnormal Color] : normal color and pigmentation [Skin Lesions 1] : no skin lesions were observed [Skin Turgor Decreased] : normal skin turgor

## 2020-11-19 NOTE — HISTORY OF PRESENT ILLNESS
[FreeTextEntry8] : pt states he feels as if he going to pass out and has not slept for three days  . he has anxiety and feels as if his heart is racing and wakes him up and it makes him jump.  He feels as if his throat closes up on him.  He is having increased urination,  no burning on urination  no fever or chills. He states he has sob when relaxing .

## 2020-11-19 NOTE — END OF VISIT
[FreeTextEntry3] : resident present  [Time Spent: ___ minutes] : I have spent [unfilled] minutes of time on the encounter. [>50% of the face to face encounter time was spent on counseling and/or coordination of care for ___] : Greater than 50% of the face to face encounter time was spent on counseling and/or coordination of care for [unfilled]

## 2020-11-19 NOTE — ASSESSMENT
[FreeTextEntry1] :  Pt is having panic attacks  likely and depression due to his recent  surgery and testicular cancer dx and treatment . he had also epidural and  had side effect and had patch done.  he has seen neurologist and had mri which of normal of brain and also of spine.    I did ekg rate 69/min normal sinus  normal ekg  qrs 100ms qt 398/426ms pr 152ms p 106ms  I will refer him to  cardiology and also do d dimer . her also has numbness on the side of his face when he starts to have a panic attack and likely due to blowing off co2. He was asked to breath into a paper bag . when this happens  for the count of 10 .  \par 2.  sleep disturbance - sleep disturbance  we discussed hygiene of sleep and sleep environment ,   Dont go to bedroom to watch TV, de clutter bedroom , try to go to bed when she is ready for bed.  No electronics near the bed or used prior to bedtime  Avoid eating and drinking prior to bedtime.  She can read a book  prior or do relaxation techniques.  Dont exercise close to bedtime.\par 3.  increased urination ua  \par 4.  sob intermittent a when he gets anxiety.    \par 5. anxietyGeneralized anxiety disorder (ALLISON) is characterized by excessive, persistent worrying which is hard to control, and by psychological and physical symptoms of anxiety that together cause significant personal distress and impairment of everyday functioning. (See 'Introduction' above.)\par \par \par ?ALLISON is common in community and clinical settings. It is probably the most common anxiety disorder in people aged over 65 years. Major depression and other anxiety disorders are common comorbidities of ALLISON. (See 'Epidemiology' above.)\par \par \par ?Genetic factors appear to predispose individuals to the development of ALLISON, though data from twin studies have been inconsistent. ALLISON shares a common heritability with major depression and with the personality trait of “neuroticism.” Adversity and undesirable life events can exacerbate symptoms of ALLISON. Neuroimaging and other studies suggest the symptoms of ALLISON are accompanied by an enhanced emotional responsiveness in fear-related brain circuits. (See 'Pathogenesis' above.)\par \par \par ?Excessive and persistent worrying is the pathognomic symptom of ALLISON, but symptoms related to hyperarousal, autonomic hyperactivity, motor tension, sleep disturbance and pain are all common. (See 'Clinical manifestations' above.)\par \par \par ?ALLISON tends to run either a chronic course fluctuating in severity over time, or an episodic course with some intervening periods of relative well-being. Comorbid ALLISON and major depression is more impairing and has a worse prognosis. (See 'Course' above.)\par \par \par ?Assessment of a patient with possible ALLISON should include a careful history, an evaluation for symptoms of ALLISON as well as alternative or comorbid psychiatric disorders, and a physical exam and laboratory studies to rule out organic causes of anxiety. (See 'Assessment' above.)\par \par \par ?Distinguishing ALLISON from major depression and dysthymia is probably the most difficult part of the disorder’s differential diagnosis, as the conditions share features such as an insidious onset, protracted course, prominent dysphoria and anxiety symptoms. Individuals with depression tend to brood self-critically on previous events and circumstances, whereas patients with ALLISON tend to worry about possible future events. (See 'Differential diagnosis' above.)\par  Clinical depression is a medical condition that goes beyond everyday sadness. It can cause profound, long-lasting symptoms and often interferes with one’s usual daily activities. A person’s vulnerability to developing this disorder is often related to many factors, including changes in brain function, genetics, and life stresses and circumstances. \par \par Depression is the most common psychiatric disorder worldwide. In the [place], nearly 20 percent of the population experiences a bout of clinical depression in their lifetime. Even so, very few people who have the disorder discuss their symptoms with a healthcare provider. Instead, two-thirds of people with depression who see a healthcare provider for routine care come in complaining of physical symptoms, such as headache, back problems, or chronic pain. \par \par People may be reluctant to discuss their depression symptoms for a number of reasons. Often they’re concerned about the stigma of mental illness; sometimes they worry that a primary care provider is not the appropriate health professional to ask; some see their condition as a personal weakness rather than a “real” illness; and some are worried about the implications of having a psychiatric illness entered into their permanent record. But effective treatments do exist, and not treating depression can lead to serious problems.\par \par People with untreated depression have a lower quality of life, a higher risk of suicide, and worse physical prognoses if they have any medical conditions besides depression. In fact, people with depression are almost twice as likely to die as people without depression, mostly due to other medical conditions. What’s more, depression affects not only the person with the disorder but also those around him or her. \par \par I discussed starting him on  zoloft   and gave him referral to psychiatrist as well.  he presently doesn’t have fever tachycardia or tachypnea.  he is anxious .  I will test for thyroid disease

## 2020-11-19 NOTE — HISTORY OF PRESENT ILLNESS
[FreeTextEntry1] : Mr. Ray CorreaJr. was initially evaluated on November 13, 2020.  He is a 29-year-old gentleman with a history of memory and swallowing difficulties, and bladder dysfunction.  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  He developed subsequent postural headaches for which he underwent a blood patch on November 9th.  He subsequently developed left lower lip, jaw and left upper and lower extremity sensory symptoms.  He was referred for MRI of the brain with and without contrast in the emergency room was unremarkable.  He was discharged.\par \par He reports improvement in his left jaw numbness and tingling.  His left arm still feels mildly weak and his hand tingles.  He describes neck pressure.  He has mild mid lumbar pain which radiates to his left buttock and posterior thigh.  He had mild right calf tightness today.  His left leg is slightly tingling.  He has had slight penile numbness.

## 2020-11-19 NOTE — PHYSICAL EXAM
[General Appearance - Alert] : alert [General Appearance - In No Acute Distress] : in no acute distress [Cranial Nerves Optic (II)] : visual acuity intact bilaterally,  visual fields full to confrontation, pupils equal round and reactive to light [Cranial Nerves Oculomotor (III)] : extraocular motion intact [Cranial Nerves Facial (VII)] : face symmetrical [Cranial Nerves Vestibulocochlear (VIII)] : hearing was intact bilaterally [Cranial Nerves Glossopharyngeal (IX)] : tongue and palate midline [Cranial Nerves Accessory (XI - Cranial And Spinal)] : head turning and shoulder shrug symmetric [Cranial Nerves Hypoglossal (XII)] : there was no tongue deviation with protrusion [Coordination - Dysmetria Impaired Finger-to-Nose Bilateral] : not present [Coordination - Dysmetria Impaired Heel-to-Shin Bilateral] : not present [FreeTextEntry4] : He was alert and appropriate.  Speech was fluent.  There was no dysarthria. [FreeTextEntry5] : Pupils were equal reactive.  Visual fields were full.  Funduscopic examination was normal.  There was decreased light touch and pinprick on the entire left side of the face. [FreeTextEntry6] : Muscle bulk and tone were normal.  There is no tremor.  Strength was full.  There was no drift. [FreeTextEntry7] : Was decreased pinprick sensation in the left arm and leg.  Vibration and joint position sense were intact. [FreeTextEntry8] : Gait was normal.  Tandem was stable.  Romberg was negative. [FreeTextEntry9] : DTRs were 2 throughout.  Plantar responses were flexor. [Sclera] : the sclera and conjunctiva were normal [PERRL With Normal Accommodation] : pupils were equal in size, round, reactive to light, with normal accommodation [Extraocular Movements] : extraocular movements were intact [Outer Ear] : the ears and nose were normal in appearance [Oropharynx] : the oropharynx was normal [Neck Appearance] : the appearance of the neck was normal [Neck Cervical Mass (___cm)] : no neck mass was observed [Jugular Venous Distention Increased] : there was no jugular-venous distention [Thyroid Diffuse Enlargement] : the thyroid was not enlarged [Thyroid Nodule] : there were no palpable thyroid nodules [Auscultation Breath Sounds / Voice Sounds] : lungs were clear to auscultation bilaterally [Heart Rate And Rhythm] : heart rate was normal and rhythm regular [Heart Sounds] : normal S1 and S2 [Heart Sounds Gallop] : no gallops [Murmurs] : no murmurs [Heart Sounds Pericardial Friction Rub] : no pericardial rub [Arterial Pulses Carotid] : carotid pulses were normal with no bruits [Full Pulse] : the pedal pulses are present [Edema] : there was no peripheral edema [Bowel Sounds] : normal bowel sounds [Abdomen Soft] : soft [Abdomen Tenderness] : non-tender [Abdomen Mass (___ Cm)] : no abdominal mass palpated [No CVA Tenderness] : no ~M costovertebral angle tenderness [No Spinal Tenderness] : no spinal tenderness [Skin Color & Pigmentation] : normal skin color and pigmentation [Skin Turgor] : normal skin turgor [] : no rash [FreeTextEntry1] : Constitutional:  Patient was well-developed, well-nourished and in no acute distress. \par \par Head:  Normocephalic, atraumatic. Tympanic membranes were clear. \par \par Neck:  Supple with full range of motion. \par \par Cardiovascular:  Cardiac rhythm was regular without murmur. There were no carotid bruits. Peripheral pulses were full and symmetric. \par \par Respiratory:  Lungs were clear. \par \par Abdomen:  Soft and nontender. \par \par Spine: There was mild cervical and mid lumbar tenderness.  There was pain on straight leg raising bilaterally at approximately 30 degrees.  Alex's maneuver was negative. \par \par Skin:  There were no rashes. \par \par NEUROLOGICAL EXAMINATION:\par \par Mental Status: She was alert and oriented. Speech was fluent. There was no dysarthria. \par \par Cranial Nerves: \par \par II: Visual acuity was 20/ 20 bilaterally with near card. Pupils were equal and reactive. Visual fields were full. Funduscopic examination was normal. \par \par III, IV, VI:  Eye movements were full without nystagmus. \par \par V: Facial sensation was intact. \par \par VII: Facial strength was normal. \par \par VIII: Hearing was equal. \par \par IX, X: Palatal movement was normal. Phonation was normal. \par \par XI: Sternocleidomastoids and trapezii were normal. \par \par XII: Tongue was midline and movements normal. There was no lingual atrophy or fasciculations. \par \par Motor Examination: Muscle bulk, tone and strength were normal. \par \par Sensory Examination: Pinprick, vibration and joint position sense were intact. \par \par Reflexes: DTRs were 2+ throughout. \par \par Plantar Responses: Plantar responses were flexor. \par \par Coordination/Cerebellar Function: There was no dysmetria on finger to nose or heel to shin testing. \par \par Gait/Stance: Gait and tandem were normal. Romberg was negative.\par

## 2020-11-19 NOTE — PHYSICAL EXAM
[General Appearance - Alert] : alert [General Appearance - In No Acute Distress] : in no acute distress [Cranial Nerves Optic (II)] : visual acuity intact bilaterally,  visual fields full to confrontation, pupils equal round and reactive to light [Cranial Nerves Oculomotor (III)] : extraocular motion intact [Cranial Nerves Facial (VII)] : face symmetrical [Cranial Nerves Vestibulocochlear (VIII)] : hearing was intact bilaterally [Cranial Nerves Glossopharyngeal (IX)] : tongue and palate midline [Cranial Nerves Accessory (XI - Cranial And Spinal)] : head turning and shoulder shrug symmetric [Cranial Nerves Hypoglossal (XII)] : there was no tongue deviation with protrusion [Coordination - Dysmetria Impaired Finger-to-Nose Bilateral] : not present [Coordination - Dysmetria Impaired Heel-to-Shin Bilateral] : not present [FreeTextEntry4] : He was alert and appropriate.  Speech was fluent.  There was no dysarthria. [FreeTextEntry5] : Pupils were equal reactive.  Visual fields were full.  Funduscopic examination was normal.  There was decreased light touch and pinprick on the entire left side of the face. [FreeTextEntry6] : Muscle bulk and tone were normal.  There is no tremor.  Strength was full.  There was no drift. [FreeTextEntry7] : Was decreased pinprick sensation in the left arm and leg.  Vibration and joint position sense were intact. [FreeTextEntry8] : Gait was normal.  Tandem was stable.  Romberg was negative. [FreeTextEntry9] : DTRs were 2 throughout.  Plantar responses were flexor. [Sclera] : the sclera and conjunctiva were normal [PERRL With Normal Accommodation] : pupils were equal in size, round, reactive to light, with normal accommodation [Extraocular Movements] : extraocular movements were intact [Outer Ear] : the ears and nose were normal in appearance [Oropharynx] : the oropharynx was normal [Neck Appearance] : the appearance of the neck was normal [Jugular Venous Distention Increased] : there was no jugular-venous distention [Neck Cervical Mass (___cm)] : no neck mass was observed [Thyroid Diffuse Enlargement] : the thyroid was not enlarged [Thyroid Nodule] : there were no palpable thyroid nodules [Auscultation Breath Sounds / Voice Sounds] : lungs were clear to auscultation bilaterally [Heart Rate And Rhythm] : heart rate was normal and rhythm regular [Heart Sounds] : normal S1 and S2 [Heart Sounds Gallop] : no gallops [Murmurs] : no murmurs [Heart Sounds Pericardial Friction Rub] : no pericardial rub [Arterial Pulses Carotid] : carotid pulses were normal with no bruits [Full Pulse] : the pedal pulses are present [Edema] : there was no peripheral edema [Bowel Sounds] : normal bowel sounds [Abdomen Soft] : soft [Abdomen Tenderness] : non-tender [Abdomen Mass (___ Cm)] : no abdominal mass palpated [No CVA Tenderness] : no ~M costovertebral angle tenderness [No Spinal Tenderness] : no spinal tenderness [Skin Color & Pigmentation] : normal skin color and pigmentation [Skin Turgor] : normal skin turgor [] : no rash [FreeTextEntry1] : Constitutional:  Patient was well-developed, well-nourished and in no acute distress. \par \par Head:  Normocephalic, atraumatic. Tympanic membranes were clear. \par \par Neck:  Supple with full range of motion. \par \par Cardiovascular:  Cardiac rhythm was regular without murmur. There were no carotid bruits. Peripheral pulses were full and symmetric. \par \par Respiratory:  Lungs were clear. \par \par Abdomen:  Soft and nontender. \par \par Spine: There was mild cervical and mid lumbar tenderness.  There was pain on straight leg raising bilaterally at approximately 30 degrees.  Alex's maneuver was negative. \par \par Skin:  There were no rashes. \par \par NEUROLOGICAL EXAMINATION:\par \par Mental Status: She was alert and oriented. Speech was fluent. There was no dysarthria. \par \par Cranial Nerves: \par \par II: Visual acuity was 20/ 20 bilaterally with near card. Pupils were equal and reactive. Visual fields were full. Funduscopic examination was normal. \par \par III, IV, VI:  Eye movements were full without nystagmus. \par \par V: Facial sensation was intact. \par \par VII: Facial strength was normal. \par \par VIII: Hearing was equal. \par \par IX, X: Palatal movement was normal. Phonation was normal. \par \par XI: Sternocleidomastoids and trapezii were normal. \par \par XII: Tongue was midline and movements normal. There was no lingual atrophy or fasciculations. \par \par Motor Examination: Muscle bulk, tone and strength were normal. \par \par Sensory Examination: Pinprick, vibration and joint position sense were intact. \par \par Reflexes: DTRs were 2+ throughout. \par \par Plantar Responses: Plantar responses were flexor. \par \par Coordination/Cerebellar Function: There was no dysmetria on finger to nose or heel to shin testing. \par \par Gait/Stance: Gait and tandem were normal. Romberg was negative.\par

## 2020-11-19 NOTE — ASSESSMENT
[FreeTextEntry1] : Mr. Correa is a 29-year-old status post left orchiectomy utilizing spinal anesthesia and subsequent low pressure headache requiring blood patch.  He experienced subsequent left hemisensory and motor symptoms.  MRI of the brain was unrevealing.  His persistent symptoms will prompt additional MR imaging of the cervical and lumbar spine.  Further management will depend upon those results and his clinical course.  He is scheduled to follow-up with his urologist later today.  I suggested close telephone and office follow-up.

## 2020-11-20 ENCOUNTER — NON-APPOINTMENT (OUTPATIENT)
Age: 30
End: 2020-11-20

## 2020-11-22 ENCOUNTER — EMERGENCY (EMERGENCY)
Facility: HOSPITAL | Age: 30
LOS: 1 days | Discharge: HOME | End: 2020-11-22
Attending: EMERGENCY MEDICINE | Admitting: EMERGENCY MEDICINE
Payer: COMMERCIAL

## 2020-11-22 VITALS
RESPIRATION RATE: 17 BRPM | HEIGHT: 73 IN | TEMPERATURE: 98 F | HEART RATE: 92 BPM | DIASTOLIC BLOOD PRESSURE: 83 MMHG | SYSTOLIC BLOOD PRESSURE: 130 MMHG | OXYGEN SATURATION: 98 %

## 2020-11-22 DIAGNOSIS — Z90.79 ACQUIRED ABSENCE OF OTHER GENITAL ORGAN(S): Chronic | ICD-10-CM

## 2020-11-22 LAB
25(OH)D3 SERPL-MCNC: 27.3 NG/ML
ALBUMIN SERPL ELPH-MCNC: 4.7 G/DL — SIGNIFICANT CHANGE UP (ref 3.3–5)
ALBUMIN SERPL ELPH-MCNC: 4.8 G/DL
ALP BLD-CCNC: 62 U/L
ALP SERPL-CCNC: 56 U/L — SIGNIFICANT CHANGE UP (ref 40–120)
ALT FLD-CCNC: 42 U/L — HIGH (ref 4–41)
ALT SERPL-CCNC: 38 U/L
ANION GAP SERPL CALC-SCNC: 11 MMO/L — SIGNIFICANT CHANGE UP (ref 7–14)
ANION GAP SERPL CALC-SCNC: 12 MMOL/L
APPEARANCE UR: CLEAR — SIGNIFICANT CHANGE UP
APPEARANCE: CLEAR
AST SERPL-CCNC: 21 U/L
AST SERPL-CCNC: 24 U/L — SIGNIFICANT CHANGE UP (ref 4–40)
BACTERIA # UR AUTO: SIGNIFICANT CHANGE UP
BASOPHILS # BLD AUTO: 0.04 K/UL
BASOPHILS # BLD AUTO: 0.06 K/UL — SIGNIFICANT CHANGE UP (ref 0–0.2)
BASOPHILS NFR BLD AUTO: 0.5 %
BASOPHILS NFR BLD AUTO: 0.5 % — SIGNIFICANT CHANGE UP (ref 0–2)
BILIRUB SERPL-MCNC: 0.6 MG/DL
BILIRUB SERPL-MCNC: 0.8 MG/DL — SIGNIFICANT CHANGE UP (ref 0.2–1.2)
BILIRUB UR-MCNC: NEGATIVE — SIGNIFICANT CHANGE UP
BILIRUBIN URINE: NEGATIVE
BLOOD UR QL VISUAL: NEGATIVE — SIGNIFICANT CHANGE UP
BLOOD URINE: NEGATIVE
BUN SERPL-MCNC: 14 MG/DL — SIGNIFICANT CHANGE UP (ref 7–23)
BUN SERPL-MCNC: 7 MG/DL
CALCIUM SERPL-MCNC: 10 MG/DL
CALCIUM SERPL-MCNC: 9.8 MG/DL — SIGNIFICANT CHANGE UP (ref 8.4–10.5)
CHLORIDE SERPL-SCNC: 103 MMOL/L — SIGNIFICANT CHANGE UP (ref 98–107)
CHLORIDE SERPL-SCNC: 104 MMOL/L
CO2 SERPL-SCNC: 24 MMOL/L
CO2 SERPL-SCNC: 26 MMOL/L — SIGNIFICANT CHANGE UP (ref 22–31)
COLOR SPEC: SIGNIFICANT CHANGE UP
COLOR: NORMAL
CREAT SERPL-MCNC: 0.99 MG/DL
CREAT SERPL-MCNC: 1 MG/DL — SIGNIFICANT CHANGE UP (ref 0.5–1.3)
DEPRECATED D DIMER PPP IA-ACNC: 263 NG/ML DDU
EOSINOPHIL # BLD AUTO: 0.05 K/UL
EOSINOPHIL # BLD AUTO: 0.07 K/UL — SIGNIFICANT CHANGE UP (ref 0–0.5)
EOSINOPHIL NFR BLD AUTO: 0.6 % — SIGNIFICANT CHANGE UP (ref 0–6)
EOSINOPHIL NFR BLD AUTO: 0.7 %
FOLATE SERPL-MCNC: 15.1 NG/ML
GLUCOSE QUALITATIVE U: NEGATIVE
GLUCOSE SERPL-MCNC: 98 MG/DL
GLUCOSE SERPL-MCNC: 99 MG/DL — SIGNIFICANT CHANGE UP (ref 70–99)
GLUCOSE UR-MCNC: NEGATIVE — SIGNIFICANT CHANGE UP
HCT VFR BLD CALC: 48.6 % — SIGNIFICANT CHANGE UP (ref 39–50)
HCT VFR BLD CALC: 49.1 %
HGB BLD-MCNC: 16 G/DL
HGB BLD-MCNC: 16.5 G/DL — SIGNIFICANT CHANGE UP (ref 13–17)
IMM GRANULOCYTES NFR BLD AUTO: 0.1 %
IMM GRANULOCYTES NFR BLD AUTO: 0.4 % — SIGNIFICANT CHANGE UP (ref 0–1.5)
KETONES UR-MCNC: >150 — HIGH
KETONES URINE: NEGATIVE
LEUKOCYTE ESTERASE UR-ACNC: NEGATIVE — SIGNIFICANT CHANGE UP
LEUKOCYTE ESTERASE URINE: NEGATIVE
LYMPHOCYTES # BLD AUTO: 1.7 K/UL
LYMPHOCYTES # BLD AUTO: 19.7 % — SIGNIFICANT CHANGE UP (ref 13–44)
LYMPHOCYTES # BLD AUTO: 2.18 K/UL — SIGNIFICANT CHANGE UP (ref 1–3.3)
LYMPHOCYTES NFR BLD AUTO: 23.3 %
MAN DIFF?: NORMAL
MCHC RBC-ENTMCNC: 28.5 PG
MCHC RBC-ENTMCNC: 28.8 PG — SIGNIFICANT CHANGE UP (ref 27–34)
MCHC RBC-ENTMCNC: 32.6 GM/DL
MCHC RBC-ENTMCNC: 34 % — SIGNIFICANT CHANGE UP (ref 32–36)
MCV RBC AUTO: 85 FL — SIGNIFICANT CHANGE UP (ref 80–100)
MCV RBC AUTO: 87.4 FL
MONOCYTES # BLD AUTO: 0.68 K/UL
MONOCYTES # BLD AUTO: 0.85 K/UL — SIGNIFICANT CHANGE UP (ref 0–0.9)
MONOCYTES NFR BLD AUTO: 7.7 % — SIGNIFICANT CHANGE UP (ref 2–14)
MONOCYTES NFR BLD AUTO: 9.3 %
MUCOUS THREADS # UR AUTO: SIGNIFICANT CHANGE UP
NEUTROPHILS # BLD AUTO: 4.83 K/UL
NEUTROPHILS # BLD AUTO: 7.88 K/UL — HIGH (ref 1.8–7.4)
NEUTROPHILS NFR BLD AUTO: 66.1 %
NEUTROPHILS NFR BLD AUTO: 71.1 % — SIGNIFICANT CHANGE UP (ref 43–77)
NITRITE UR-MCNC: NEGATIVE — SIGNIFICANT CHANGE UP
NITRITE URINE: NEGATIVE
NRBC # FLD: 0 K/UL — SIGNIFICANT CHANGE UP (ref 0–0)
PH UR: 6 — SIGNIFICANT CHANGE UP (ref 5–8)
PH URINE: 7.5
PLATELET # BLD AUTO: 267 K/UL — SIGNIFICANT CHANGE UP (ref 150–400)
PLATELET # BLD AUTO: 272 K/UL
PMV BLD: 11.4 FL — SIGNIFICANT CHANGE UP (ref 7–13)
POTASSIUM SERPL-MCNC: 3.9 MMOL/L — SIGNIFICANT CHANGE UP (ref 3.5–5.3)
POTASSIUM SERPL-SCNC: 3.9 MMOL/L — SIGNIFICANT CHANGE UP (ref 3.5–5.3)
POTASSIUM SERPL-SCNC: 4.9 MMOL/L
PROT SERPL-MCNC: 7.2 G/DL
PROT SERPL-MCNC: 7.5 G/DL — SIGNIFICANT CHANGE UP (ref 6–8.3)
PROT UR-MCNC: 100 — HIGH
PROTEIN URINE: NEGATIVE
RBC # BLD: 5.62 M/UL
RBC # BLD: 5.72 M/UL — SIGNIFICANT CHANGE UP (ref 4.2–5.8)
RBC # FLD: 12.6 %
RBC # FLD: 12.7 % — SIGNIFICANT CHANGE UP (ref 10.3–14.5)
RBC CASTS # UR COMP ASSIST: SIGNIFICANT CHANGE UP (ref 0–?)
SARS-COV-2 IGG SERPL IA-ACNC: <0.1 INDEX
SARS-COV-2 IGG SERPL QL IA: NEGATIVE
SODIUM SERPL-SCNC: 140 MMOL/L
SODIUM SERPL-SCNC: 140 MMOL/L — SIGNIFICANT CHANGE UP (ref 135–145)
SP GR SPEC: > 1.04 — HIGH (ref 1–1.04)
SPECIFIC GRAVITY URINE: 1.01
SQUAMOUS # UR AUTO: SIGNIFICANT CHANGE UP
TROPONIN T, HIGH SENSITIVITY: 7 NG/L — SIGNIFICANT CHANGE UP (ref ?–14)
TSH SERPL-ACNC: 2.03 UIU/ML
UROBILINOGEN FLD QL: SIGNIFICANT CHANGE UP
UROBILINOGEN URINE: NORMAL
VIT B12 SERPL-MCNC: 602 PG/ML
WBC # BLD: 11.08 K/UL — HIGH (ref 3.8–10.5)
WBC # FLD AUTO: 11.08 K/UL — HIGH (ref 3.8–10.5)
WBC # FLD AUTO: 7.31 K/UL
WBC UR QL: SIGNIFICANT CHANGE UP (ref 0–?)

## 2020-11-22 PROCEDURE — 99284 EMERGENCY DEPT VISIT MOD MDM: CPT | Mod: 25

## 2020-11-22 PROCEDURE — 71046 X-RAY EXAM CHEST 2 VIEWS: CPT | Mod: 26

## 2020-11-22 PROCEDURE — 93010 ELECTROCARDIOGRAM REPORT: CPT

## 2020-11-22 NOTE — ED PROVIDER NOTE - PATIENT PORTAL LINK FT
You can access the FollowMyHealth Patient Portal offered by Health system by registering at the following website: [email or web address]. By joining [facility]’s FollowMyHealth portal, you will also be able to view your health information using other applications (apps) compatible with our system.

## 2020-11-22 NOTE — ED PROVIDER NOTE - NS ED ROS FT
General: no fever, no chills  Eyes: no vision changes, no eye pain  Cardiovascular: +cp, no edema  Respiratory: no cough, +sob  Gastrointestinal: no abdominal pain, no nausea, no vomiting, no diarrhea  Genitourinary: no dysuria, no hematuria  Musculoskeletal: no muscle pain, no joint pain, +left leg pain  Skin: no rash, no lesions  Neuro: no numbness, +tingling  Psych: no depression, no anxiety

## 2020-11-22 NOTE — ED PROVIDER NOTE - PROGRESS NOTE DETAILS
Daryl Tracey, PGY-1- Discussed patient with urology. Feels discharge can be worked up out patient. Will advise pt to f/u with his urologist this week Sign out follow-up: D-dimer WNL. No hypoxia or tachycardia. No h/o DVT/PE. No recent travel.  surgery was same day. No h/o blood disorders in family. Pt has h/o testicular seminoma in remission. Pt had concerns over IV contrast for CT due to h/o itching post-contrast and did not want to undergo CTA is d-dimer WNL. Discussed low pre-test probability with patient. No SOB currently. Pt report symptoms mainly at night. Pt notes h/o dysphagia (followed by GI) which has been worse since surgery. He finds himself clearing his throat more frequently and his throat is getting irritated and making him wake up at night feeling he cant breath until he clears his throat. SOB not noticible during day. Suspect SOB may be upper [place] Sign out follow-up: D-dimer WNL. No hypoxia or tachycardia. No h/o DVT/PE. No recent travel.  surgery was same day. No h/o blood disorders in family. Pt has h/o testicular seminoma in remission. Pt had concerns over IV contrast for CT due to h/o itching post-contrast and did not want to undergo CTA is d-dimer WNL. Discussed low pre-test probability with patient. No SOB currently. Pt report symptoms mainly at night. Pt notes h/o dysphagia (followed by GI) which has been worse since surgery. He finds himself clearing his throat more frequently and his throat is getting irritated and making him wake up at night feeling he cant breath until he clears his throat. SOB not noticeable during day. Suspect SOB may be upper airway mediated. Lungs CTA. Mouth: Oropharynx clear, uvula midline, no tonsilar hypertrophy, exudate or erythema, no anterior cervical lymphadenopathy. No drooling. No hoarseness. Sign out follow-up: D-dimer WNL. No hypoxia or tachycardia. No h/o DVT/PE. No recent travel.  surgery was same day. No h/o blood disorders in family. Pt has h/o testicular seminoma in remission. Pt had concerns over IV contrast for CT due to h/o itching post-contrast and did not want to undergo CTA is d-dimer WNL. Discussed low pre-test probability with patient. No SOB currently. Pt report symptoms mainly at night. Pt notes h/o dysphagia (followed by GI) which has been worse since surgery. He finds himself clearing his throat more frequently and his throat is getting irritated and making him wake up at night feeling he cant breath until he clears his throat. SOB not noticeable during day. Suspect SOB may be upper airway mediated. Lungs CTA. Mouth: Oropharynx clear, uvula midline, no tonsilar hypertrophy, exudate or erythema, no anterior cervical lymphadenopathy. No drooling. No hoarseness. Will try Decadron and Maalox. ALANIS. Pt now states he is short of breath again and his chest hurts. Will obtain CTA. Spoke with radiology resident, pt will need full steroid prep for CT. Pt agreeable. Pt also notes concern over increasing dysphagia. Now to liquids as well as solids. Will obtain CT Head and neurology consult. ALANIS. Pt reports SOB is intermittent. Able to speak in full sentences. Discussed once again pros and cons of CTA in setting of D-dimer with pt's Sx and risk profile, and need for steroid prep for CT, pt understood, and declined CT. Understands warning signs for return to ED. Regarding dysphagia, pt reports able to swallow liquids, symptoms stable, saw Neurology recently for Sx and had an MRI Brain within past week which her was told was unremarkable. Declines further neuro eval in ED. Advised f/u with GI and Neuro (pt already established) to further manage symptoms. ALANIS.

## 2020-11-22 NOTE — ED PROVIDER NOTE - PHYSICAL EXAMINATION
General: well appearing, no acute distress, AOx3  Skin: normal color for race, no rash  Head: normocephalic, atraumatic  Eyes: clear conjunctiva, EOMI  ENMT: airway patent, no nasal discharge  Cardiovascular: normal rate, normal rhythm, S1/S2  Pulmonary: clear to auscultation bilaterally, no rales, rhonchi, or wheeze  Abdomen: soft, mild LLQ tender to palpation  Musculoskeletal: moving extremities well, no deformity, no calf tenderness b/l, pain with extension of left lower extremity   Psych: appears anxious

## 2020-11-22 NOTE — ED PROVIDER NOTE - OBJECTIVE STATEMENT
29 year old male s/p orchiectomy 11/6, complicated by needing blood patch s/p spinal anesthesia, is sent to ED by PCP for evaluation of elevated d-dimer. Per patient, he has had chest pain and sob x5 days. He states it is intermittent and sharp. He notes difficulty sleeping. He had labs done 3 days ago and was found to have an elevated d-dimer. He was sent to ED for further testing. He notes tingling down the back of his left leg when he lifts it. Patient also notes constipation and discharge mixing with urine. He denies any fever, chills, cough, vomiting, abdominal pain, or testicular pain. 29 year old male s/p orchiectomy , complicated by needing blood patch s/p spinal anesthesia, is sent to ED by PCP for evaluation of elevated d-dimer. Per patient, he has had chest pain and sob x5 days. He states it is intermittent and sharp. He notes difficulty sleeping. He had labs done 3 days ago and was found to have an elevated d-dimer. He was sent to ED for further testing. He notes tingling down the back of his left leg when he lifts it. Patient also notes constipation and discharge mixing with urine. He denies any fever, chills, cough, vomiting, abdominal pain, or testicular pain.    Attendinyo male presents with shortness of breath and anxiety for about 5 days.  pcp ordered d-dimer which was around 260.  pt sent in for CTA and/or possible repeat of d-dimer.  no fever.  no cough.

## 2020-11-22 NOTE — ED PROVIDER NOTE - CLINICAL SUMMARY MEDICAL DECISION MAKING FREE TEXT BOX
29 year old male s/p 29 year old male s/p orchiectomy with chest pain, sob, elevated d-dimer out patient. VSS, EKG WNL. Will obtain labs, CTA, likely d/c home to f/u with PCP and urology

## 2020-11-22 NOTE — ED ADULT NURSE NOTE - OBJECTIVE STATEMENT
Pt received to intake room 6. PT A&Ox4, ambulatory. Pt states that he was sent in by his PCP for an elevated d-dimer result. Pt states that he has been having intermittent sharp chest pain and episodes of SOB for the past 5 days. Pt endorses difficulty swallowing, states that everything comes back up that he tries to swallow and difficulty sleeping. Pt states that he believes he is also having slight urinary retention. PMH orchiectomy 2 weeks ago, testicular ca. Respirations equal and unlabored, no acute distress noted. 20g IV placed in right AC, labs drawn and sent as ordered. Vital signs as noted, comfort measures provided, call bell within reach. MD at bedside, will continue to monitor.

## 2020-11-22 NOTE — ED ADULT TRIAGE NOTE - CHIEF COMPLAINT QUOTE
Patient sent in by PMD for abnormal D-Dimer results. Patient c/o shortness of breath, chest pain x1 day. Patient denies any cough or fevers. Hx. orchiectomy on 11/6.

## 2020-11-23 LAB
D DIMER BLD IA.RAPID-MCNC: 228 NG/ML — SIGNIFICANT CHANGE UP
TESTOST BND SERPL-MCNC: 15 PG/ML
TESTOST SERPL-MCNC: 266.1 NG/DL
TROPONIN T, HIGH SENSITIVITY: 8 NG/L — SIGNIFICANT CHANGE UP (ref ?–14)

## 2020-11-23 RX ORDER — DEXAMETHASONE 0.5 MG/5ML
6 ELIXIR ORAL ONCE
Refills: 0 | Status: COMPLETED | OUTPATIENT
Start: 2020-11-23 | End: 2020-11-23

## 2020-11-23 RX ORDER — DEXAMETHASONE 0.5 MG/5ML
6 ELIXIR ORAL ONCE
Refills: 0 | Status: DISCONTINUED | OUTPATIENT
Start: 2020-11-23 | End: 2020-11-23

## 2020-11-23 RX ADMIN — Medication 30 MILLILITER(S): at 01:06

## 2020-11-24 LAB
CULTURE RESULTS: SIGNIFICANT CHANGE UP
SPECIMEN SOURCE: SIGNIFICANT CHANGE UP

## 2020-11-25 ENCOUNTER — APPOINTMENT (OUTPATIENT)
Dept: NEUROLOGY | Facility: CLINIC | Age: 30
End: 2020-11-25
Payer: COMMERCIAL

## 2020-11-25 VITALS
DIASTOLIC BLOOD PRESSURE: 83 MMHG | SYSTOLIC BLOOD PRESSURE: 124 MMHG | WEIGHT: 264 LBS | HEART RATE: 100 BPM | BODY MASS INDEX: 34.99 KG/M2 | HEIGHT: 73 IN

## 2020-11-25 VITALS — TEMPERATURE: 98 F

## 2020-11-25 DIAGNOSIS — M54.5 LOW BACK PAIN: ICD-10-CM

## 2020-11-25 PROCEDURE — 99214 OFFICE O/P EST MOD 30 MIN: CPT

## 2020-11-25 NOTE — HISTORY OF PRESENT ILLNESS
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  Incision is healing well.  Tolerating regular diet.  Ambulating without difficulty.

## 2020-11-25 NOTE — HISTORY OF PRESENT ILLNESS
[FreeTextEntry1] : Mr. OCONNELL returned to the office having been last seen on November 17, 2012.  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  He developed subsequent postural headache  prompting treatment with blood patch on November 9th.  He subsequently developed left jaw, arm and leg tingling and mild  motor symptoms.\par \par He was referred to the emergency room where he underwent an MRI of the brain on November 13th.  That study was unremarkable.\par \par At his last visit, he continued to experience mild left arm weakness, mild tingling of his left hand and leg, neck pressure, mild mid low back pain radiating to his left buttock and posterior thigh and mild penile numbness.  His left jaw tingling improved.  His examination was notable for mild neck and lumbar tenderness with pain on straight leg raising bilaterally.  There were no other significant motor, sensory or reflex abnormalities.\par \par He subsequently underwent MRIs of the cervical and lumbar spine.  There was no evidence of cord abnormality or significant nerve root compression.  There was no epidural hematoma or other abnormality.\par \par Mr. Oconnell reports improvement in his left mandibular and penile numbness.  This only occurs when he becomes anxious.  He complains of discomfort on the shaft of his penis when he voids.  Yesterday he experienced pain in his left sole and mild discomfort in his left calf.  His left arm feels heavy particularly in the morning.  He complains of mild left leg weakness with ambulation.  He has lower lumbar pressure.  He continues to complain of dysphagia and difficulty managing his saliva.\par \par On November 22th Dr. Ledezma referred him to the emergency room because of an elevated D-dimer.  A repeat study in the ER apparently was normal and he was discharged.\par \par

## 2020-11-25 NOTE — ASSESSMENT
[FreeTextEntry1] : Mr. 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  He subsequently experienced left-sided sensory and motor symptoms.  He has undergone extensive imaging of the brain, cervical and lumbar spine which has not revealed significant abnormality or cause of his symptoms.  He admits to severe anxiety but this only had its onset after his recent testicular procedure.\par \par He will follow up with Dr. Ledezma regarding his medical issues.  I suggested psychiatric evaluation to address his severe anxiety.  I also suggested an ENT consultation to further assess his swallowing complaints.\par \par I suggested that he undergo additional serologic testing including a paraneoplastic profile and myasthenia panel.  I will also check additional blood tests including sedimentation rate, C-reactive protein and NE.  I suggested close clinical follow-up including an office visit in 2 to 4 weeks.  If his neurologic symptoms persist, electrodiagnostic testing will be performed.  Telephone contact will be maintained in the meantime.\par

## 2020-11-25 NOTE — REVIEW OF SYSTEMS
[Arm Weakness] : arm weakness [Leg Weakness] : leg weakness [Numbness] : numbness [Tingling] : tingling [FreeTextEntry8] : Penile shaft discomfort

## 2020-11-25 NOTE — ASSESSMENT
[FreeTextEntry1] : Reviewed the pathology results with the patient.  Stage Ia seminoma: Recommend active surveillance.  Reviewed the surveillance schedule including need for repeat imaging.\par \par Recommend follow-up in 6 months with CT scan A/P with IV contrast.\par Explained importance of monthly self testicular exam.

## 2020-11-25 NOTE — CONSULT LETTER
[Dear  ___] : Dear  [unfilled], [Consult Letter:] : I had the pleasure of evaluating your patient, [unfilled]. [Please see my note below.] : Please see my note below. [Consult Closing:] : Thank you very much for allowing me to participate in the care of this patient.  If you have any questions, please do not hesitate to contact me. [Sincerely,] : Sincerely, [FreeTextEntry3] : Sy Peck MD\par  [DrEdwin  ___] : Dr. GREENE

## 2020-11-25 NOTE — PHYSICAL EXAM
[FreeTextEntry1] : Constitutional:  Patient was well-developed, well-nourished and in no acute distress. \par \par Head:  Normocephalic, atraumatic. Tympanic membranes were clear. \par \par Neck:  Supple with full range of motion. \par \par Cardiovascular:  Cardiac rhythm was regular without murmur. There were no carotid bruits. Peripheral pulses were full and symmetric. \par \par Respiratory:  Lungs were clear. \par \par Abdomen:  Soft and nontender. \par \par Spine: There was only mild lumbar tenderness.  There is no pain on straight leg raising.\par \par Skin:  There were no rashes. \par \par NEUROLOGICAL EXAMINATION:\par \par Mental Status: He was alert and oriented. Speech was fluent. There was no dysarthria. \par \par Cranial Nerves: \par \par II: Visual acuity was 20/ 20 bilaterally with near card and glasses. Pupils were equal and reactive. Visual fields were full. Funduscopic examination was normal. \par \par III, IV, VI:  Eye movements were full without nystagmus. \par \par V: Facial sensation was intact. \par \par VII: Facial strength was normal. \par \par VIII: Hearing was equal. \par \par IX, X: Palatal movement was normal. Phonation was normal. \par \par XI: Sternocleidomastoids and trapezii were normal. \par \par XII: Tongue was midline and movements normal. There was no lingual atrophy or fasciculations. \par \par Motor Examination: Muscle bulk, tone and strength were normal. \par \par Sensory Examination: Pinprick, vibration and joint position sense were intact. \par \par Reflexes: DTRs were 2+ throughout. \par \par Plantar Responses: Plantar responses were flexor. \par \par Coordination/Cerebellar Function: There was no dysmetria on finger to nose or heel to shin testing. \par \par Gait/Stance: Gait and tandem were normal. Romberg was negative.\par

## 2020-11-30 ENCOUNTER — APPOINTMENT (OUTPATIENT)
Dept: INTERNAL MEDICINE | Facility: CLINIC | Age: 30
End: 2020-11-30
Payer: COMMERCIAL

## 2020-11-30 DIAGNOSIS — R10.13 EPIGASTRIC PAIN: ICD-10-CM

## 2020-11-30 DIAGNOSIS — Z91.09 OTHER ALLERGY STATUS, OTHER THAN TO DRUGS AND BIOLOGICAL SUBSTANCES: ICD-10-CM

## 2020-11-30 DIAGNOSIS — R13.10 DYSPHAGIA, UNSPECIFIED: ICD-10-CM

## 2020-11-30 DIAGNOSIS — R51.9 HEADACHE, UNSPECIFIED: ICD-10-CM

## 2020-11-30 PROCEDURE — 99442: CPT

## 2020-12-02 ENCOUNTER — APPOINTMENT (OUTPATIENT)
Dept: NEUROLOGY | Facility: CLINIC | Age: 30
End: 2020-12-02
Payer: COMMERCIAL

## 2020-12-02 ENCOUNTER — NON-APPOINTMENT (OUTPATIENT)
Age: 30
End: 2020-12-02

## 2020-12-02 ENCOUNTER — LABORATORY RESULT (OUTPATIENT)
Age: 30
End: 2020-12-02

## 2020-12-02 VITALS — HEART RATE: 74 BPM | SYSTOLIC BLOOD PRESSURE: 122 MMHG | RESPIRATION RATE: 16 BRPM | DIASTOLIC BLOOD PRESSURE: 71 MMHG

## 2020-12-02 VITALS — TEMPERATURE: 94.3 F

## 2020-12-02 DIAGNOSIS — R20.0 ANESTHESIA OF SKIN: ICD-10-CM

## 2020-12-02 DIAGNOSIS — M62.81 MUSCLE WEAKNESS (GENERALIZED): ICD-10-CM

## 2020-12-02 DIAGNOSIS — R20.2 ANESTHESIA OF SKIN: ICD-10-CM

## 2020-12-02 LAB
BASOPHILS # BLD AUTO: 0.03 K/UL
BASOPHILS NFR BLD AUTO: 0.7 %
EOSINOPHIL # BLD AUTO: 0.09 K/UL
EOSINOPHIL NFR BLD AUTO: 2 %
ERYTHROCYTE [SEDIMENTATION RATE] IN BLOOD BY WESTERGREN METHOD: 4 MM/HR
HCT VFR BLD CALC: 49 %
HGB BLD-MCNC: 15.9 G/DL
IMM GRANULOCYTES NFR BLD AUTO: 0.2 %
LYMPHOCYTES # BLD AUTO: 1.29 K/UL
LYMPHOCYTES NFR BLD AUTO: 28.2 %
MAN DIFF?: NORMAL
MCHC RBC-ENTMCNC: 29 PG
MCHC RBC-ENTMCNC: 32.4 GM/DL
MCV RBC AUTO: 89.3 FL
MONOCYTES # BLD AUTO: 0.38 K/UL
MONOCYTES NFR BLD AUTO: 8.3 %
NEUTROPHILS # BLD AUTO: 2.77 K/UL
NEUTROPHILS NFR BLD AUTO: 60.6 %
PLATELET # BLD AUTO: 216 K/UL
RBC # BLD: 5.49 M/UL
RBC # FLD: 12.9 %
WBC # FLD AUTO: 4.57 K/UL

## 2020-12-02 PROCEDURE — 99072 ADDL SUPL MATRL&STAF TM PHE: CPT

## 2020-12-02 PROCEDURE — 99214 OFFICE O/P EST MOD 30 MIN: CPT

## 2020-12-02 RX ORDER — PANTOPRAZOLE 40 MG/1
40 TABLET, DELAYED RELEASE ORAL
Qty: 1 | Refills: 2 | Status: DISCONTINUED | COMMUNITY
Start: 2020-06-01 | End: 2020-12-02

## 2020-12-02 RX ORDER — ACETAMINOPHEN 325 MG/1
TABLET, FILM COATED ORAL
Refills: 0 | Status: DISCONTINUED | COMMUNITY
End: 2020-12-02

## 2020-12-02 NOTE — ASSESSMENT
[FreeTextEntry1] : Mr. 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  He has subsequently experienced multiple somatic complaints.  His neurologic examination and extensive imaging have been unremarkable.  It is obvious that he is very anxious about his medical condition and is seeking a unifying diagnosis.\par \par I encouraged him to consult with a psychiatrist to address his anxiety symptoms.  I encouraged him to undergo the blood tests that I recommended at his last visit.  He will consult with his ophthalmologist regarding his visual complaints.  He will return to the office for nerve conduction studies and electromyography to exclude a peripheral nervous system problem.  Further management will depend upon these results and his clinical course.

## 2020-12-02 NOTE — HISTORY OF PRESENT ILLNESS
[FreeTextEntry1] : Mr. Ray Correa return to the office having been last seen on November 25, 2020.  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  He subsequently developed multiple somatic and neurologic complaints.  He underwent imaging of the brain cervical and lumbar spine which was unremarkable.  There was concern that he was experiencing significant anxiety. I suggested additional blood tests.  Those tests have not yet been performed.\par \par Mr. Correa complains of continued mild left arm and leg weakness.  He has had right shoulder pain since yesterday.  When he crosses his legs or arms, he experiences transient numbness.  On November 27th, he experienced heaviness of his eyebrows, whooshing on the right side of his face and occipital tension lasting 2 hours.  He complains of right greater than left eye pressure and mild blurring.  He complains of neck and low back pain.

## 2020-12-02 NOTE — PHYSICAL EXAM
[FreeTextEntry1] : Constitutional:  Patient was well-developed, well-nourished and in no acute distress. \par \par Head:  Normocephalic, atraumatic. Tympanic membranes were clear. \par \par Neck:  Supple with full range of motion. \par \par Cardiovascular:  Cardiac rhythm was regular without murmur. There were no carotid bruits. Peripheral pulses were full and symmetric. \par \par Respiratory:  Lungs were clear. \par \par Abdomen:  Soft and nontender. \par \par Spine:  Nontender. \par \par Skin:  There were no rashes. \par \par NEUROLOGICAL EXAMINATION:\par \par Mental Status:  He was alert and oriented. Speech was fluent. There was no dysarthria. \par \par Cranial Nerves: \par \par II: Visual acuity was 20/ 20 bilaterally with near card. Pupils were equal and reactive. Visual fields were full. Funduscopic examination was normal. \par \par III, IV, VI:  Eye movements were full without nystagmus. \par \par V: Facial sensation was intact. \par \par VII: Facial strength was normal. \par \par VIII: Hearing was equal. \par \par IX, X: Palatal movement was normal. Phonation was normal. \par \par XI: Sternocleidomastoids and trapezii were normal. \par \par XII: Tongue was midline and movements normal. There was no lingual atrophy or fasciculations. \par \par Motor Examination: Muscle bulk, tone and strength were normal. \par \par Sensory Examination: Pinprick, vibration and joint position sense were intact. \par \par Reflexes: DTRs were 2+ throughout. \par \par Plantar Responses: Plantar responses were flexor. \par \par Coordination/Cerebellar Function: There was no dysmetria on finger to nose or heel to shin testing. \par \par Gait/Stance: Gait and tandem were normal. Romberg was negative.\par

## 2020-12-03 ENCOUNTER — NON-APPOINTMENT (OUTPATIENT)
Age: 30
End: 2020-12-03

## 2020-12-03 LAB
B BURGDOR AB SER-IMP: NEGATIVE
B BURGDOR IGG+IGM SER QL IB: NORMAL
B BURGDOR IGM PATRN SER IB-IMP: NEGATIVE
B BURGDOR18KD IGG SER QL IB: NORMAL
B BURGDOR23KD IGG SER QL IB: NORMAL
B BURGDOR23KD IGM SER QL IB: NORMAL
B BURGDOR28KD IGG SER QL IB: NORMAL
B BURGDOR30KD IGG SER QL IB: NORMAL
B BURGDOR31KD IGG SER QL IB: NORMAL
B BURGDOR39KD IGG SER QL IB: NORMAL
B BURGDOR39KD IGM SER QL IB: NORMAL
B BURGDOR41KD IGG SER QL IB: PRESENT
B BURGDOR41KD IGM SER QL IB: PRESENT
B BURGDOR45KD IGG SER QL IB: NORMAL
B BURGDOR58KD IGG SER QL IB: NORMAL
B BURGDOR66KD IGG SER QL IB: NORMAL
B BURGDOR93KD IGG SER QL IB: NORMAL
CRP SERPL-MCNC: <0.1 MG/DL
VIT B12 SERPL-MCNC: 708 PG/ML

## 2020-12-07 ENCOUNTER — NON-APPOINTMENT (OUTPATIENT)
Age: 30
End: 2020-12-07

## 2020-12-07 LAB
ACHR BIND AB SER-ACNC: <0.3 NMOL/L
ALBUMIN MFR SERPL ELPH: 61.5 %
ALBUMIN SERPL-MCNC: 4.6 G/DL
ALBUMIN/GLOB SERPL: 1.6 RATIO
ALBUPE: 16.4 %
ALPHA1 GLOB MFR SERPL ELPH: 3.9 %
ALPHA1 GLOB SERPL ELPH-MCNC: 0.3 G/DL
ALPHA1UPE: 34.1 %
ALPHA2 GLOB MFR SERPL ELPH: 7.6 %
ALPHA2 GLOB SERPL ELPH-MCNC: 0.6 G/DL
ALPHA2UPE: 22.2 %
B-GLOBULIN MFR SERPL ELPH: 10.6 %
B-GLOBULIN SERPL ELPH-MCNC: 0.8 G/DL
BETAUPE: 14.2 %
CK BB SERPL ELPH-CCNC: 0 % (ref 0–?)
CK MB CFR SERPL ELPH: 0 %
CK MM SERPL ELPH-CCNC: 100 %
CREAT 24H UR-MCNC: NORMAL G/24 H
CREATINE KINASE,TOTAL,SERUM: 98 U/L
CREATININE UR (MAYO): 543 MG/DL
DEPRECATED KAPPA LC FREE/LAMBDA SER: 1.24 RATIO
GAMMA GLOB FLD ELPH-MCNC: 1.2 G/DL
GAMMA GLOB MFR SERPL ELPH: 16.4 %
GAMMAUPE: 13.1 %
IGA 24H UR QL IFE: NORMAL
IGA SER QL IEP: 181 MG/DL
IGG SER QL IEP: 1217 MG/DL
IGM SER QL IEP: 128 MG/DL
INTERPRETATION SERPL IEP-IMP: NORMAL
KAPPA LC 24H UR QL: NORMAL
KAPPA LC CSF-MCNC: 1.1 MG/DL
KAPPA LC SERPL-MCNC: 1.36 MG/DL
M PROTEIN SPEC IFE-MCNC: NORMAL
MACRO TYPE 1: 0 %
MACRO TYPE 2: 0 %
PROT PATTERN 24H UR ELPH-IMP: NORMAL
PROT SERPL-MCNC: 7.4 G/DL
PROT SERPL-MCNC: 7.4 G/DL
PROT UR-MCNC: 29 MG/DL
PROT UR-MCNC: 29 MG/DL
SPECIMEN VOL 24H UR: NORMAL ML

## 2020-12-10 ENCOUNTER — NON-APPOINTMENT (OUTPATIENT)
Age: 30
End: 2020-12-10

## 2020-12-10 LAB — METHYLMALONATE SERPL-SCNC: 100 NMOL/L

## 2020-12-12 ENCOUNTER — NON-APPOINTMENT (OUTPATIENT)
Age: 30
End: 2020-12-12

## 2020-12-12 LAB — MUSK ANTIBODY TEST: <1 U/ML

## 2020-12-21 ENCOUNTER — NON-APPOINTMENT (OUTPATIENT)
Age: 30
End: 2020-12-21

## 2020-12-21 LAB — ACHR MOD AB SER-ACNC: <12 %

## 2021-01-13 ENCOUNTER — APPOINTMENT (OUTPATIENT)
Dept: INTERNAL MEDICINE | Facility: CLINIC | Age: 31
End: 2021-01-13

## 2021-01-28 ENCOUNTER — APPOINTMENT (OUTPATIENT)
Dept: NEUROLOGY | Facility: CLINIC | Age: 31
End: 2021-01-28

## 2021-04-07 ENCOUNTER — APPOINTMENT (OUTPATIENT)
Dept: INTERNAL MEDICINE | Facility: CLINIC | Age: 31
End: 2021-04-07

## 2021-05-01 ENCOUNTER — RESULT REVIEW (OUTPATIENT)
Age: 31
End: 2021-05-01

## 2021-05-06 ENCOUNTER — APPOINTMENT (OUTPATIENT)
Dept: UROLOGY | Facility: CLINIC | Age: 31
End: 2021-05-06
Payer: COMMERCIAL

## 2021-05-06 VITALS
RESPIRATION RATE: 16 BRPM | HEART RATE: 76 BPM | DIASTOLIC BLOOD PRESSURE: 77 MMHG | WEIGHT: 261 LBS | SYSTOLIC BLOOD PRESSURE: 135 MMHG | TEMPERATURE: 97 F | HEIGHT: 73 IN | BODY MASS INDEX: 34.59 KG/M2

## 2021-05-06 PROCEDURE — 99072 ADDL SUPL MATRL&STAF TM PHE: CPT

## 2021-05-06 PROCEDURE — 99213 OFFICE O/P EST LOW 20 MIN: CPT

## 2021-05-06 RX ORDER — BUSPIRONE HYDROCHLORIDE 7.5 MG/1
7.5 TABLET ORAL
Qty: 30 | Refills: 0 | Status: COMPLETED | COMMUNITY
Start: 2020-11-18 | End: 2021-05-06

## 2021-05-06 RX ORDER — LISDEXAMFETAMINE DIMESYLATE 10 MG/1
10 CAPSULE ORAL
Refills: 0 | Status: ACTIVE | COMMUNITY
Start: 2021-05-06

## 2021-05-07 LAB
AFP-TM SERPL-MCNC: <1.8 NG/ML
HCG SERPL-MCNC: <1 MIU/ML
LDH SERPL-CCNC: 190 U/L

## 2021-05-18 ENCOUNTER — APPOINTMENT (OUTPATIENT)
Dept: CT IMAGING | Facility: IMAGING CENTER | Age: 31
End: 2021-05-18
Payer: COMMERCIAL

## 2021-05-18 ENCOUNTER — OUTPATIENT (OUTPATIENT)
Dept: OUTPATIENT SERVICES | Facility: HOSPITAL | Age: 31
LOS: 1 days | End: 2021-05-18
Payer: COMMERCIAL

## 2021-05-18 DIAGNOSIS — Z00.8 ENCOUNTER FOR OTHER GENERAL EXAMINATION: ICD-10-CM

## 2021-05-18 DIAGNOSIS — C62.92 MALIGNANT NEOPLASM OF LEFT TESTIS, UNSPECIFIED WHETHER DESCENDED OR UNDESCENDED: ICD-10-CM

## 2021-05-18 DIAGNOSIS — Z90.79 ACQUIRED ABSENCE OF OTHER GENITAL ORGAN(S): Chronic | ICD-10-CM

## 2021-05-18 PROCEDURE — 74177 CT ABD & PELVIS W/CONTRAST: CPT | Mod: 26

## 2021-05-18 PROCEDURE — 74177 CT ABD & PELVIS W/CONTRAST: CPT

## 2021-05-19 NOTE — ASSESSMENT
[FreeTextEntry1] : Serum tumor markers today.\par Due for CT a/p to assess for lymphadenopathy.\par Will call with results.

## 2021-05-19 NOTE — PHYSICAL EXAM
[General Appearance - Well Developed] : well developed [General Appearance - Well Nourished] : well nourished [Normal Appearance] : normal appearance [Well Groomed] : well groomed [General Appearance - In No Acute Distress] : no acute distress [Abdomen Soft] : soft [Abdomen Tenderness] : non-tender [Costovertebral Angle Tenderness] : no ~M costovertebral angle tenderness [Urethral Meatus] : meatus normal [Urinary Bladder Findings] : the bladder was normal on palpation [Scrotum] : the scrotum was normal [Testes Mass (___cm)] : there were no testicular masses [No Prostate Nodules] : no prostate nodules [Edema] : no peripheral edema [] : no respiratory distress [Respiration, Rhythm And Depth] : normal respiratory rhythm and effort [Exaggerated Use Of Accessory Muscles For Inspiration] : no accessory muscle use [Oriented To Time, Place, And Person] : oriented to person, place, and time [Affect] : the affect was normal [Mood] : the mood was normal [Not Anxious] : not anxious [Normal Station and Gait] : the gait and station were normal for the patient's age [No Focal Deficits] : no focal deficits [No Palpable Adenopathy] : no palpable adenopathy [FreeTextEntry1] : left orchiectomy

## 2021-05-19 NOTE — HISTORY OF PRESENT ILLNESS
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 Denies any hematuria, dysuria or incontinence. Doing testicular self exams and reports no masses or abnormalities

## 2021-06-02 ENCOUNTER — APPOINTMENT (OUTPATIENT)
Dept: UROLOGY | Facility: CLINIC | Age: 31
End: 2021-06-02
Payer: COMMERCIAL

## 2021-06-02 DIAGNOSIS — R30.0 DYSURIA: ICD-10-CM

## 2021-06-02 PROCEDURE — 99072 ADDL SUPL MATRL&STAF TM PHE: CPT

## 2021-06-02 PROCEDURE — 99214 OFFICE O/P EST MOD 30 MIN: CPT

## 2021-06-02 NOTE — PHYSICAL EXAM
[General Appearance - Well Developed] : well developed [General Appearance - Well Nourished] : well nourished [Normal Appearance] : normal appearance [Well Groomed] : well groomed [General Appearance - In No Acute Distress] : no acute distress [Abdomen Soft] : soft [Abdomen Tenderness] : non-tender [Costovertebral Angle Tenderness] : no ~M costovertebral angle tenderness [Urethral Meatus] : meatus normal [Urinary Bladder Findings] : the bladder was normal on palpation [Scrotum] : the scrotum was normal [Testes Mass (___cm)] : there were no testicular masses [No Prostate Nodules] : no prostate nodules [FreeTextEntry1] : Absent left scrotal contents [Edema] : no peripheral edema [] : no respiratory distress [Respiration, Rhythm And Depth] : normal respiratory rhythm and effort [Exaggerated Use Of Accessory Muscles For Inspiration] : no accessory muscle use [Oriented To Time, Place, And Person] : oriented to person, place, and time [Affect] : the affect was normal [Mood] : the mood was normal [Not Anxious] : not anxious [Normal Station and Gait] : the gait and station were normal for the patient's age [No Focal Deficits] : no focal deficits [No Palpable Adenopathy] : no palpable adenopathy

## 2021-06-02 NOTE — ASSESSMENT
[FreeTextEntry1] : Very pleasant 30-year-old gentleman who presents for follow-up of left testicular cancer, weak urinary stream, new complaint of dysuria\par -Discussed potential etiologies of weak urinary stream\par -Discussed potential etiologies of dysuria\par -Urinalysis\par -Urine culture\par -hCG reviewed–negative\par -AFP reviewed–negative\par -LDH reviewed–negative\par -CT images reviewed demonstrating no evidence of metastatic disease\par -Cystoscopy in 1 to 2 weeks

## 2021-06-02 NOTE — HISTORY OF PRESENT ILLNESS
[FreeTextEntry1] : Very pleasant 30-year-old gentleman who presents for follow-up of left testicular cancer status post left orchiectomy, weak urinary stream.  He underwent an orchiectomy approximately 7 months ago which demonstrated T1 seminoma.  He reports no problems after the surgery.  Recent hCG, LDH, AFP all negative.  Recent CT scan demonstrates no evidence of metastatic disease.\par \par He continues to report a weak urinary stream.  This is very bothersome to him.  Also reports new complaint of dysuria.  No hematuria.  No nausea or vomiting.  No other complaints.  No fevers or chills.

## 2021-06-03 LAB
APPEARANCE: CLEAR
BACTERIA: NEGATIVE
BILIRUBIN URINE: NEGATIVE
BLOOD URINE: NEGATIVE
COLOR: YELLOW
GLUCOSE QUALITATIVE U: NEGATIVE
HYALINE CASTS: 0 /LPF
KETONES URINE: NORMAL
LEUKOCYTE ESTERASE URINE: NEGATIVE
MICROSCOPIC-UA: NORMAL
NITRITE URINE: NEGATIVE
PH URINE: 6
PROTEIN URINE: ABNORMAL
RED BLOOD CELLS URINE: 2 /HPF
SPECIFIC GRAVITY URINE: 1.03
SQUAMOUS EPITHELIAL CELLS: 1 /HPF
UROBILINOGEN URINE: NORMAL
WHITE BLOOD CELLS URINE: 2 /HPF

## 2021-06-04 LAB — BACTERIA UR CULT: NORMAL

## 2021-06-11 ENCOUNTER — APPOINTMENT (OUTPATIENT)
Dept: UROLOGY | Facility: CLINIC | Age: 31
End: 2021-06-11
Payer: COMMERCIAL

## 2021-06-11 ENCOUNTER — APPOINTMENT (OUTPATIENT)
Dept: UROLOGY | Facility: CLINIC | Age: 31
End: 2021-06-11

## 2021-06-11 DIAGNOSIS — C62.92 MALIGNANT NEOPLASM OF LEFT TESTIS, UNSPECIFIED WHETHER DESCENDED OR UNDESCENDED: ICD-10-CM

## 2021-06-11 DIAGNOSIS — N35.919 UNSPECIFIED URETHRAL STRICTURE, MALE, UNSPECIFIED SITE: ICD-10-CM

## 2021-06-11 DIAGNOSIS — C62.90 MALIGNANT NEOPLASM OF UNSPECIFIED TESTIS, UNSPECIFIED WHETHER DESCENDED OR UNDESCENDED: ICD-10-CM

## 2021-06-11 DIAGNOSIS — R39.12 POOR URINARY STREAM: ICD-10-CM

## 2021-06-11 PROCEDURE — 99214 OFFICE O/P EST MOD 30 MIN: CPT | Mod: 25

## 2021-06-11 PROCEDURE — 52000 CYSTOURETHROSCOPY: CPT

## 2021-06-11 PROCEDURE — 99072 ADDL SUPL MATRL&STAF TM PHE: CPT

## 2021-06-11 NOTE — ASSESSMENT
[FreeTextEntry1] : Very pleasant 30-year-old gentleman who presents for follow-up of left testicular cancer status post left orchiectomy, weak urinary stream, new finding today of urethral stricture\par -Findings on cystoscopy reviewed with the patient in detail demonstrating a urethral stricture just distal to the prostatic urethra.  This was easily passable with the cystoscope, however patient requested that the exam be terminated prior to entering the bladder\par -Urinalysis reviewed demonstrating 2 red blood cells per high-powered field\par -Urine culture reviewed demonstrating no evidence of urinary tract infection\par -hCG, LDH, AFP all reviewed; no evidence of recurrent testicular cancer\par -Patient reports that he is moving to [facility] in the near future and will follow up with a urologist in [place]\par -We discussed different treatment options for urethral stricture and at this time he would like to forego treatment given the relatively open nature of the stricture\par -Discussed the importance of follow-up for history of testicular cancer

## 2021-06-11 NOTE — PHYSICAL EXAM
[General Appearance - Well Developed] : well developed [General Appearance - Well Nourished] : well nourished [Normal Appearance] : normal appearance [Well Groomed] : well groomed [General Appearance - In No Acute Distress] : no acute distress [Abdomen Soft] : soft [Abdomen Tenderness] : non-tender [Costovertebral Angle Tenderness] : no ~M costovertebral angle tenderness [Urethral Meatus] : meatus normal [Urinary Bladder Findings] : the bladder was normal on palpation [Scrotum] : the scrotum was normal [FreeTextEntry1] : Absent left scrotal contents [Edema] : no peripheral edema [] : no respiratory distress [Respiration, Rhythm And Depth] : normal respiratory rhythm and effort [Exaggerated Use Of Accessory Muscles For Inspiration] : no accessory muscle use [Oriented To Time, Place, And Person] : oriented to person, place, and time [Affect] : the affect was normal [Mood] : the mood was normal [Not Anxious] : not anxious [Normal Station and Gait] : the gait and station were normal for the patient's age [No Focal Deficits] : no focal deficits [No Palpable Adenopathy] : no palpable adenopathy

## 2021-06-11 NOTE — HISTORY OF PRESENT ILLNESS
[FreeTextEntry1] : Very pleasant 30-year-old gentleman who presents for follow-up of left testicular seminoma, weak urinary stream, new finding today of urethral stricture.  A cystoscopy was performed today which demonstrated a urethral stricture just distal to the prostatic urethra.  This was easily passable with the cystoscope, however the patient requested that the exam be terminated early before entering the bladder, precluding evaluation of the bladder.  Recent CT scan demonstrated no evidence of metastatic testicular cancer.

## 2025-03-05 NOTE — H&P PST ADULT - RESPIRATORY
LVM stating orders faxed.   Breath Sounds equal & clear to percussion & auscultation, no accessory muscle use